# Patient Record
Sex: FEMALE | Race: WHITE | NOT HISPANIC OR LATINO | URBAN - METROPOLITAN AREA
[De-identification: names, ages, dates, MRNs, and addresses within clinical notes are randomized per-mention and may not be internally consistent; named-entity substitution may affect disease eponyms.]

---

## 2017-06-01 PROBLEM — Z00.00 ENCOUNTER FOR PREVENTIVE HEALTH EXAMINATION: Status: ACTIVE | Noted: 2017-06-01

## 2017-06-02 ENCOUNTER — OUTPATIENT (OUTPATIENT)
Dept: OUTPATIENT SERVICES | Facility: HOSPITAL | Age: 42
LOS: 1 days | End: 2017-06-02
Payer: COMMERCIAL

## 2017-06-02 VITALS
RESPIRATION RATE: 16 BRPM | SYSTOLIC BLOOD PRESSURE: 140 MMHG | HEART RATE: 92 BPM | HEIGHT: 65 IN | TEMPERATURE: 98 F | DIASTOLIC BLOOD PRESSURE: 80 MMHG | WEIGHT: 293 LBS

## 2017-06-02 DIAGNOSIS — Z98.890 OTHER SPECIFIED POSTPROCEDURAL STATES: Chronic | ICD-10-CM

## 2017-06-02 DIAGNOSIS — Z01.818 ENCOUNTER FOR OTHER PREPROCEDURAL EXAMINATION: ICD-10-CM

## 2017-06-02 DIAGNOSIS — D27.0 BENIGN NEOPLASM OF RIGHT OVARY: Chronic | ICD-10-CM

## 2017-06-02 DIAGNOSIS — N93.8 OTHER SPECIFIED ABNORMAL UTERINE AND VAGINAL BLEEDING: ICD-10-CM

## 2017-06-02 DIAGNOSIS — I10 ESSENTIAL (PRIMARY) HYPERTENSION: ICD-10-CM

## 2017-06-02 DIAGNOSIS — Z90.721 ACQUIRED ABSENCE OF OVARIES, UNILATERAL: Chronic | ICD-10-CM

## 2017-06-02 LAB
ANION GAP SERPL CALC-SCNC: 14 MMOL/L — SIGNIFICANT CHANGE UP (ref 5–17)
APTT BLD: 28.4 SEC — SIGNIFICANT CHANGE UP (ref 27.5–37.4)
BASOPHILS # BLD AUTO: 0 K/UL — SIGNIFICANT CHANGE UP (ref 0–0.2)
BASOPHILS NFR BLD AUTO: 0.3 % — SIGNIFICANT CHANGE UP (ref 0–2)
BUN SERPL-MCNC: 13 MG/DL — SIGNIFICANT CHANGE UP (ref 8–20)
CALCIUM SERPL-MCNC: 9.3 MG/DL — SIGNIFICANT CHANGE UP (ref 8.6–10.2)
CHLORIDE SERPL-SCNC: 98 MMOL/L — SIGNIFICANT CHANGE UP (ref 98–107)
CO2 SERPL-SCNC: 25 MMOL/L — SIGNIFICANT CHANGE UP (ref 22–29)
CREAT SERPL-MCNC: 0.4 MG/DL — LOW (ref 0.5–1.3)
EOSINOPHIL # BLD AUTO: 0.2 K/UL — SIGNIFICANT CHANGE UP (ref 0–0.5)
EOSINOPHIL NFR BLD AUTO: 2.1 % — SIGNIFICANT CHANGE UP (ref 0–6)
GLUCOSE SERPL-MCNC: 105 MG/DL — SIGNIFICANT CHANGE UP (ref 70–115)
HCT VFR BLD CALC: 36.9 % — LOW (ref 37–47)
HGB BLD-MCNC: 11.8 G/DL — LOW (ref 12–16)
INR BLD: 0.95 RATIO — SIGNIFICANT CHANGE UP (ref 0.88–1.16)
LYMPHOCYTES # BLD AUTO: 2.4 K/UL — SIGNIFICANT CHANGE UP (ref 1–4.8)
LYMPHOCYTES # BLD AUTO: 20.6 % — SIGNIFICANT CHANGE UP (ref 20–55)
MCHC RBC-ENTMCNC: 27.8 PG — SIGNIFICANT CHANGE UP (ref 27–31)
MCHC RBC-ENTMCNC: 32 G/DL — SIGNIFICANT CHANGE UP (ref 32–36)
MCV RBC AUTO: 86.8 FL — SIGNIFICANT CHANGE UP (ref 81–99)
MONOCYTES # BLD AUTO: 0.7 K/UL — SIGNIFICANT CHANGE UP (ref 0–0.8)
MONOCYTES NFR BLD AUTO: 5.7 % — SIGNIFICANT CHANGE UP (ref 3–10)
NEUTROPHILS # BLD AUTO: 8.2 K/UL — HIGH (ref 1.8–8)
NEUTROPHILS NFR BLD AUTO: 70.5 % — SIGNIFICANT CHANGE UP (ref 37–73)
PLATELET # BLD AUTO: 395 K/UL — SIGNIFICANT CHANGE UP (ref 150–400)
POTASSIUM SERPL-MCNC: 4.4 MMOL/L — SIGNIFICANT CHANGE UP (ref 3.5–5.3)
POTASSIUM SERPL-SCNC: 4.4 MMOL/L — SIGNIFICANT CHANGE UP (ref 3.5–5.3)
PROTHROM AB SERPL-ACNC: 10.4 SEC — SIGNIFICANT CHANGE UP (ref 9.8–12.7)
RBC # BLD: 4.25 M/UL — LOW (ref 4.4–5.2)
RBC # FLD: 14.4 % — SIGNIFICANT CHANGE UP (ref 11–15.6)
SODIUM SERPL-SCNC: 137 MMOL/L — SIGNIFICANT CHANGE UP (ref 135–145)
WBC # BLD: 11.7 K/UL — HIGH (ref 4.8–10.8)
WBC # FLD AUTO: 11.7 K/UL — HIGH (ref 4.8–10.8)

## 2017-06-02 PROCEDURE — 85027 COMPLETE CBC AUTOMATED: CPT

## 2017-06-02 PROCEDURE — 93005 ELECTROCARDIOGRAM TRACING: CPT

## 2017-06-02 PROCEDURE — 85610 PROTHROMBIN TIME: CPT

## 2017-06-02 PROCEDURE — 85730 THROMBOPLASTIN TIME PARTIAL: CPT

## 2017-06-02 PROCEDURE — G0463: CPT

## 2017-06-02 PROCEDURE — 93010 ELECTROCARDIOGRAM REPORT: CPT

## 2017-06-02 PROCEDURE — 80048 BASIC METABOLIC PNL TOTAL CA: CPT

## 2017-06-02 NOTE — H&P PST ADULT - NSANTHOSAYNRD_GEN_A_CORE
No. ALAN screening performed.  STOP BANG Legend: 0-2 = LOW Risk; 3-4 = INTERMEDIATE Risk; 5-8 = HIGH Risk

## 2017-06-02 NOTE — H&P PST ADULT - ASSESSMENT
41 year old female  present to [facility] for dilation and curettage, hysteroscopy

## 2017-06-02 NOTE — ASU PATIENT PROFILE, ADULT - LEARNING ASSESSMENT (PATIENT) ADDITIONAL COMMENTS
Instructed pt on pre-op instructions, tips for safer surgery, pain management scale and verbalized understanding of all. Ebola Screening: Negative,

## 2017-06-02 NOTE — H&P PST ADULT - HISTORY OF PRESENT ILLNESS
41 year old female  with PMHX HTN, who has been experiencing irregular and heavy mensuration x 3 months. She is now schedule for dilation and curettage, hysteroscopy.

## 2017-06-02 NOTE — ASU PATIENT PROFILE, ADULT - PSH
Benign teratoma of ovary, right    H/O oophorectomy  right  History of arthroscopy of right shoulder

## 2017-06-09 ENCOUNTER — OUTPATIENT (OUTPATIENT)
Dept: OUTPATIENT SERVICES | Facility: HOSPITAL | Age: 42
LOS: 1 days | Discharge: HOME | End: 2017-06-09
Payer: COMMERCIAL

## 2017-06-09 ENCOUNTER — RESULT REVIEW (OUTPATIENT)
Age: 42
End: 2017-06-09

## 2017-06-09 VITALS
SYSTOLIC BLOOD PRESSURE: 150 MMHG | OXYGEN SATURATION: 100 % | TEMPERATURE: 98 F | DIASTOLIC BLOOD PRESSURE: 95 MMHG | RESPIRATION RATE: 16 BRPM | HEIGHT: 65 IN | HEART RATE: 100 BPM | WEIGHT: 293 LBS

## 2017-06-09 VITALS
HEART RATE: 95 BPM | OXYGEN SATURATION: 100 % | SYSTOLIC BLOOD PRESSURE: 143 MMHG | TEMPERATURE: 97 F | RESPIRATION RATE: 16 BRPM | DIASTOLIC BLOOD PRESSURE: 79 MMHG

## 2017-06-09 DIAGNOSIS — Z90.721 ACQUIRED ABSENCE OF OVARIES, UNILATERAL: Chronic | ICD-10-CM

## 2017-06-09 DIAGNOSIS — Z98.890 OTHER SPECIFIED POSTPROCEDURAL STATES: Chronic | ICD-10-CM

## 2017-06-09 DIAGNOSIS — D27.0 BENIGN NEOPLASM OF RIGHT OVARY: Chronic | ICD-10-CM

## 2017-06-09 DIAGNOSIS — G54.2 CERVICAL ROOT DISORDERS, NOT ELSEWHERE CLASSIFIED: ICD-10-CM

## 2017-06-09 DIAGNOSIS — N93.8 OTHER SPECIFIED ABNORMAL UTERINE AND VAGINAL BLEEDING: ICD-10-CM

## 2017-06-09 PROCEDURE — 88305 TISSUE EXAM BY PATHOLOGIST: CPT

## 2017-06-09 PROCEDURE — 88305 TISSUE EXAM BY PATHOLOGIST: CPT | Mod: 26

## 2017-06-09 PROCEDURE — 58558 HYSTEROSCOPY BIOPSY: CPT

## 2017-06-09 RX ORDER — SODIUM CHLORIDE 9 MG/ML
1000 INJECTION, SOLUTION INTRAVENOUS
Qty: 0 | Refills: 0 | Status: DISCONTINUED | OUTPATIENT
Start: 2017-06-09 | End: 2017-06-24

## 2017-06-09 RX ORDER — FENTANYL CITRATE 50 UG/ML
25 INJECTION INTRAVENOUS
Qty: 0 | Refills: 0 | Status: DISCONTINUED | OUTPATIENT
Start: 2017-06-09 | End: 2017-06-09

## 2017-06-09 RX ORDER — KETOROLAC TROMETHAMINE 30 MG/ML
10 SYRINGE (ML) INJECTION ONCE
Qty: 0 | Refills: 0 | Status: DISCONTINUED | OUTPATIENT
Start: 2017-06-09 | End: 2017-06-24

## 2017-06-09 RX ORDER — IBUPROFEN 200 MG
0 TABLET ORAL
Qty: 0 | Refills: 0 | COMMUNITY

## 2017-06-09 RX ORDER — LANOLIN ALCOHOL/MO/W.PET/CERES
1 CREAM (GRAM) TOPICAL
Qty: 0 | Refills: 0 | COMMUNITY

## 2017-06-09 RX ORDER — FEXOFENADINE HCL 30 MG
1 TABLET ORAL
Qty: 0 | Refills: 0 | COMMUNITY

## 2017-06-09 RX ORDER — ONDANSETRON 8 MG/1
4 TABLET, FILM COATED ORAL ONCE
Qty: 0 | Refills: 0 | Status: COMPLETED | OUTPATIENT
Start: 2017-06-09 | End: 2017-06-09

## 2017-06-09 RX ORDER — SODIUM CHLORIDE 9 MG/ML
1000 INJECTION, SOLUTION INTRAVENOUS
Qty: 0 | Refills: 0 | Status: DISCONTINUED | OUTPATIENT
Start: 2017-06-09 | End: 2017-06-09

## 2017-06-09 RX ORDER — SODIUM CHLORIDE 9 MG/ML
3 INJECTION INTRAMUSCULAR; INTRAVENOUS; SUBCUTANEOUS ONCE
Qty: 0 | Refills: 0 | Status: DISCONTINUED | OUTPATIENT
Start: 2017-06-09 | End: 2017-06-09

## 2017-06-09 RX ADMIN — FENTANYL CITRATE 25 MICROGRAM(S): 50 INJECTION INTRAVENOUS at 17:15

## 2017-06-09 RX ADMIN — ONDANSETRON 4 MILLIGRAM(S): 8 TABLET, FILM COATED ORAL at 16:58

## 2017-06-09 RX ADMIN — FENTANYL CITRATE 25 MICROGRAM(S): 50 INJECTION INTRAVENOUS at 16:56

## 2017-06-10 ENCOUNTER — TRANSCRIPTION ENCOUNTER (OUTPATIENT)
Age: 42
End: 2017-06-10

## 2017-06-13 LAB — SURGICAL PATHOLOGY FINAL REPORT - CH: SIGNIFICANT CHANGE UP

## 2017-11-27 ENCOUNTER — OUTPATIENT (OUTPATIENT)
Dept: OUTPATIENT SERVICES | Facility: HOSPITAL | Age: 42
LOS: 1 days | End: 2017-11-27
Payer: COMMERCIAL

## 2017-11-27 ENCOUNTER — RESULT REVIEW (OUTPATIENT)
Age: 42
End: 2017-11-27

## 2017-11-27 VITALS
WEIGHT: 293 LBS | OXYGEN SATURATION: 98 % | DIASTOLIC BLOOD PRESSURE: 71 MMHG | SYSTOLIC BLOOD PRESSURE: 137 MMHG | HEART RATE: 129 BPM | HEIGHT: 65 IN | TEMPERATURE: 102 F | RESPIRATION RATE: 20 BRPM

## 2017-11-27 VITALS
WEIGHT: 293 LBS | SYSTOLIC BLOOD PRESSURE: 140 MMHG | RESPIRATION RATE: 24 BRPM | HEIGHT: 65 IN | TEMPERATURE: 103 F | HEART RATE: 120 BPM | DIASTOLIC BLOOD PRESSURE: 90 MMHG

## 2017-11-27 VITALS
OXYGEN SATURATION: 100 % | HEART RATE: 101 BPM | RESPIRATION RATE: 15 BRPM | DIASTOLIC BLOOD PRESSURE: 78 MMHG | SYSTOLIC BLOOD PRESSURE: 128 MMHG

## 2017-11-27 DIAGNOSIS — Z01.818 ENCOUNTER FOR OTHER PREPROCEDURAL EXAMINATION: ICD-10-CM

## 2017-11-27 DIAGNOSIS — Z98.890 OTHER SPECIFIED POSTPROCEDURAL STATES: Chronic | ICD-10-CM

## 2017-11-27 DIAGNOSIS — D27.0 BENIGN NEOPLASM OF RIGHT OVARY: Chronic | ICD-10-CM

## 2017-11-27 DIAGNOSIS — N92.0 EXCESSIVE AND FREQUENT MENSTRUATION WITH REGULAR CYCLE: ICD-10-CM

## 2017-11-27 DIAGNOSIS — Z90.721 ACQUIRED ABSENCE OF OVARIES, UNILATERAL: Chronic | ICD-10-CM

## 2017-11-27 LAB
ANION GAP SERPL CALC-SCNC: 13 MMOL/L — SIGNIFICANT CHANGE UP (ref 5–17)
BASOPHILS # BLD AUTO: 0 K/UL — SIGNIFICANT CHANGE UP (ref 0–0.2)
BASOPHILS NFR BLD AUTO: 0.2 % — SIGNIFICANT CHANGE UP (ref 0–2)
BLD GP AB SCN SERPL QL: SIGNIFICANT CHANGE UP
BUN SERPL-MCNC: 8 MG/DL — SIGNIFICANT CHANGE UP (ref 8–20)
CALCIUM SERPL-MCNC: 9.3 MG/DL — SIGNIFICANT CHANGE UP (ref 8.6–10.2)
CHLORIDE SERPL-SCNC: 94 MMOL/L — LOW (ref 98–107)
CO2 SERPL-SCNC: 27 MMOL/L — SIGNIFICANT CHANGE UP (ref 22–29)
CREAT SERPL-MCNC: 0.54 MG/DL — SIGNIFICANT CHANGE UP (ref 0.5–1.3)
EOSINOPHIL # BLD AUTO: 0 K/UL — SIGNIFICANT CHANGE UP (ref 0–0.5)
EOSINOPHIL NFR BLD AUTO: 0.2 % — SIGNIFICANT CHANGE UP (ref 0–6)
GLUCOSE SERPL-MCNC: 151 MG/DL — HIGH (ref 70–115)
HCG UR QL: NEGATIVE — SIGNIFICANT CHANGE UP
HCT VFR BLD CALC: 31.5 % — LOW (ref 37–47)
HGB BLD-MCNC: 10.2 G/DL — LOW (ref 12–16)
LYMPHOCYTES # BLD AUTO: 10.6 % — LOW (ref 20–55)
LYMPHOCYTES # BLD AUTO: 2.2 K/UL — SIGNIFICANT CHANGE UP (ref 1–4.8)
MCHC RBC-ENTMCNC: 26.4 PG — LOW (ref 27–31)
MCHC RBC-ENTMCNC: 32.4 G/DL — SIGNIFICANT CHANGE UP (ref 32–36)
MCV RBC AUTO: 81.6 FL — SIGNIFICANT CHANGE UP (ref 81–99)
MONOCYTES # BLD AUTO: 1.4 K/UL — HIGH (ref 0–0.8)
MONOCYTES NFR BLD AUTO: 6.8 % — SIGNIFICANT CHANGE UP (ref 3–10)
NEUTROPHILS # BLD AUTO: 16.8 K/UL — HIGH (ref 1.8–8)
NEUTROPHILS NFR BLD AUTO: 80.2 % — HIGH (ref 37–73)
PLATELET # BLD AUTO: 405 K/UL — HIGH (ref 150–400)
POTASSIUM SERPL-MCNC: 4.5 MMOL/L — SIGNIFICANT CHANGE UP (ref 3.5–5.3)
POTASSIUM SERPL-SCNC: 4.5 MMOL/L — SIGNIFICANT CHANGE UP (ref 3.5–5.3)
RBC # BLD: 3.86 M/UL — LOW (ref 4.4–5.2)
RBC # FLD: 15.7 % — HIGH (ref 11–15.6)
SODIUM SERPL-SCNC: 134 MMOL/L — LOW (ref 135–145)
TYPE + AB SCN PNL BLD: SIGNIFICANT CHANGE UP
WBC # BLD: 21 K/UL — HIGH (ref 4.8–10.8)
WBC # FLD AUTO: 21 K/UL — HIGH (ref 4.8–10.8)

## 2017-11-27 PROCEDURE — 36415 COLL VENOUS BLD VENIPUNCTURE: CPT

## 2017-11-27 PROCEDURE — 93005 ELECTROCARDIOGRAM TRACING: CPT

## 2017-11-27 PROCEDURE — 86850 RBC ANTIBODY SCREEN: CPT

## 2017-11-27 PROCEDURE — 58120 DILATION AND CURETTAGE: CPT

## 2017-11-27 PROCEDURE — 86900 BLOOD TYPING SEROLOGIC ABO: CPT

## 2017-11-27 PROCEDURE — 86901 BLOOD TYPING SEROLOGIC RH(D): CPT

## 2017-11-27 PROCEDURE — 88305 TISSUE EXAM BY PATHOLOGIST: CPT

## 2017-11-27 PROCEDURE — 80048 BASIC METABOLIC PNL TOTAL CA: CPT

## 2017-11-27 PROCEDURE — 81025 URINE PREGNANCY TEST: CPT

## 2017-11-27 PROCEDURE — G0463: CPT

## 2017-11-27 PROCEDURE — 93010 ELECTROCARDIOGRAM REPORT: CPT

## 2017-11-27 PROCEDURE — 85027 COMPLETE CBC AUTOMATED: CPT

## 2017-11-27 PROCEDURE — 88305 TISSUE EXAM BY PATHOLOGIST: CPT | Mod: 26

## 2017-11-27 RX ORDER — MEDROXYPROGESTERONE ACETATE 150 MG/ML
1 INJECTION, SUSPENSION, EXTENDED RELEASE INTRAMUSCULAR
Qty: 0 | Refills: 0 | COMMUNITY

## 2017-11-27 RX ORDER — IBUPROFEN 200 MG
1 TABLET ORAL
Qty: 0 | Refills: 0 | COMMUNITY

## 2017-11-27 RX ORDER — ONDANSETRON 8 MG/1
4 TABLET, FILM COATED ORAL ONCE
Qty: 0 | Refills: 0 | Status: DISCONTINUED | OUTPATIENT
Start: 2017-11-27 | End: 2017-11-27

## 2017-11-27 RX ORDER — FENTANYL CITRATE 50 UG/ML
50 INJECTION INTRAVENOUS
Qty: 0 | Refills: 0 | Status: DISCONTINUED | OUTPATIENT
Start: 2017-11-27 | End: 2017-11-27

## 2017-11-27 RX ORDER — SODIUM CHLORIDE 9 MG/ML
1000 INJECTION, SOLUTION INTRAVENOUS
Qty: 0 | Refills: 0 | Status: DISCONTINUED | OUTPATIENT
Start: 2017-11-27 | End: 2017-11-27

## 2017-11-27 RX ORDER — SODIUM CHLORIDE 9 MG/ML
3 INJECTION INTRAMUSCULAR; INTRAVENOUS; SUBCUTANEOUS EVERY 8 HOURS
Qty: 0 | Refills: 0 | Status: DISCONTINUED | OUTPATIENT
Start: 2017-11-27 | End: 2017-11-27

## 2017-11-27 RX ADMIN — FENTANYL CITRATE 50 MICROGRAM(S): 50 INJECTION INTRAVENOUS at 18:12

## 2017-11-27 RX ADMIN — FENTANYL CITRATE 50 MICROGRAM(S): 50 INJECTION INTRAVENOUS at 17:50

## 2017-11-27 RX ADMIN — FENTANYL CITRATE 50 MICROGRAM(S): 50 INJECTION INTRAVENOUS at 18:00

## 2017-11-27 NOTE — H&P PST ADULT - PSH
Benign teratoma of ovary, right    H/O oophorectomy  right  History of arthroscopy of right shoulder    S/P dilation and curettage

## 2017-11-27 NOTE — H&P PST ADULT - ASSESSMENT
43 yo female with menometrorrhagia.   Febrile, tachycardic, c/o nasal congestion; Dr. Ruff made aware Dr. Arteaga made aware.

## 2017-11-27 NOTE — PROGRESS NOTE ADULT - SUBJECTIVE AND OBJECTIVE BOX
Patient had heavy vaginal bleeding in the office today. Needs emergency D&C despite being febrile. Pre-op antibiotics planned.

## 2017-11-27 NOTE — ASU PATIENT PROFILE, ADULT - LEARNING ASSESSMENT (PATIENT) ADDITIONAL COMMENTS
Instructed pt on pre-op instructions, tips for safer surgery, pain management scale, last drank 1000 today and last ate prior to 2300 11/26/17. Verbalized understanding of all. Oral temp. 102.1 Nurse practitioner Emily Garcia notified of Vital Signs,

## 2017-11-27 NOTE — H&P PST ADULT - FAMILY HISTORY
Father  Still living? No  Family history of bladder cancer, Age at diagnosis: 71-80     Mother  Still living? No  Family history of breast cancer, Age at diagnosis: 41-50

## 2017-11-27 NOTE — ASU DISCHARGE PLAN (ADULT/PEDIATRIC). - NOTIFY
Fever greater than 101/Inability to Tolerate Liquids or Foods/Bleeding that does not stop/GYN Fever>100.4

## 2017-11-29 LAB — SURGICAL PATHOLOGY FINAL REPORT - CH: SIGNIFICANT CHANGE UP

## 2018-01-03 ENCOUNTER — TRANSCRIPTION ENCOUNTER (OUTPATIENT)
Age: 43
End: 2018-01-03

## 2018-01-26 ENCOUNTER — RECORD ABSTRACTING (OUTPATIENT)
Age: 43
End: 2018-01-26

## 2018-01-26 DIAGNOSIS — Z78.9 OTHER SPECIFIED HEALTH STATUS: ICD-10-CM

## 2018-01-26 DIAGNOSIS — Z87.2 PERSONAL HISTORY OF DISEASES OF THE SKIN AND SUBCUTANEOUS TISSUE: ICD-10-CM

## 2018-01-26 DIAGNOSIS — Z82.49 FAMILY HISTORY OF ISCHEMIC HEART DISEASE AND OTHER DISEASES OF THE CIRCULATORY SYSTEM: ICD-10-CM

## 2018-01-26 DIAGNOSIS — Z87.891 PERSONAL HISTORY OF NICOTINE DEPENDENCE: ICD-10-CM

## 2018-02-23 ENCOUNTER — APPOINTMENT (OUTPATIENT)
Dept: CARDIOLOGY | Facility: CLINIC | Age: 43
End: 2018-02-23
Payer: COMMERCIAL

## 2018-02-23 ENCOUNTER — MEDICATION RENEWAL (OUTPATIENT)
Age: 43
End: 2018-02-23

## 2018-02-23 VITALS
WEIGHT: 293 LBS | DIASTOLIC BLOOD PRESSURE: 102 MMHG | BODY MASS INDEX: 48.82 KG/M2 | RESPIRATION RATE: 14 BRPM | HEIGHT: 65 IN | HEART RATE: 108 BPM | SYSTOLIC BLOOD PRESSURE: 160 MMHG

## 2018-02-23 PROCEDURE — 99214 OFFICE O/P EST MOD 30 MIN: CPT

## 2018-02-23 PROCEDURE — 93000 ELECTROCARDIOGRAM COMPLETE: CPT

## 2018-03-01 ENCOUNTER — APPOINTMENT (OUTPATIENT)
Dept: CARDIOLOGY | Facility: CLINIC | Age: 43
End: 2018-03-01
Payer: COMMERCIAL

## 2018-03-01 PROCEDURE — 93306 TTE W/DOPPLER COMPLETE: CPT

## 2018-03-27 DIAGNOSIS — Z87.19 PERSONAL HISTORY OF OTHER DISEASES OF THE DIGESTIVE SYSTEM: ICD-10-CM

## 2018-05-07 ENCOUNTER — APPOINTMENT (OUTPATIENT)
Dept: CARDIOLOGY | Facility: CLINIC | Age: 43
End: 2018-05-07

## 2018-07-23 PROBLEM — I10 ESSENTIAL (PRIMARY) HYPERTENSION: Chronic | Status: ACTIVE | Noted: 2017-06-02

## 2018-07-24 ENCOUNTER — APPOINTMENT (OUTPATIENT)
Dept: BARIATRICS | Facility: CLINIC | Age: 43
End: 2018-07-24
Payer: COMMERCIAL

## 2018-07-24 VITALS
HEIGHT: 65 IN | OXYGEN SATURATION: 96 % | SYSTOLIC BLOOD PRESSURE: 150 MMHG | DIASTOLIC BLOOD PRESSURE: 94 MMHG | WEIGHT: 293 LBS | HEART RATE: 103 BPM | BODY MASS INDEX: 48.82 KG/M2

## 2018-07-24 DIAGNOSIS — Z01.818 ENCOUNTER FOR OTHER PREPROCEDURAL EXAMINATION: ICD-10-CM

## 2018-07-24 DIAGNOSIS — Z80.3 FAMILY HISTORY OF MALIGNANT NEOPLASM OF BREAST: ICD-10-CM

## 2018-07-24 DIAGNOSIS — N92.6 IRREGULAR MENSTRUATION, UNSPECIFIED: ICD-10-CM

## 2018-07-24 DIAGNOSIS — Z83.3 FAMILY HISTORY OF DIABETES MELLITUS: ICD-10-CM

## 2018-07-24 DIAGNOSIS — Z80.51 FAMILY HISTORY OF MALIGNANT NEOPLASM OF KIDNEY: ICD-10-CM

## 2018-07-24 DIAGNOSIS — Z83.49 FAMILY HISTORY OF OTHER ENDOCRINE, NUTRITIONAL AND METABOLIC DISEASES: ICD-10-CM

## 2018-07-24 DIAGNOSIS — Z82.49 FAMILY HISTORY OF ISCHEMIC HEART DISEASE AND OTHER DISEASES OF THE CIRCULATORY SYSTEM: ICD-10-CM

## 2018-07-24 PROCEDURE — 99244 OFF/OP CNSLTJ NEW/EST MOD 40: CPT

## 2018-07-24 RX ORDER — IBUPROFEN 800 MG/1
800 TABLET, FILM COATED ORAL
Qty: 30 | Refills: 0 | Status: COMPLETED | COMMUNITY
Start: 2017-11-24 | End: 2018-07-24

## 2018-07-24 RX ORDER — VALSARTAN AND HYDROCHLOROTHIAZIDE 320; 25 MG/1; MG/1
320-25 TABLET, FILM COATED ORAL DAILY
Qty: 90 | Refills: 3 | Status: DISCONTINUED | COMMUNITY
End: 2018-07-24

## 2018-07-24 RX ORDER — NORETHINDRONE 0.35 MG
0.35 KIT ORAL
Qty: 28 | Refills: 0 | Status: COMPLETED | COMMUNITY
Start: 2017-11-21 | End: 2018-07-24

## 2018-07-24 RX ORDER — OMEPRAZOLE 40 MG/1
40 CAPSULE, DELAYED RELEASE ORAL
Qty: 30 | Refills: 0 | Status: COMPLETED | COMMUNITY
Start: 2017-12-27 | End: 2018-07-24

## 2018-09-11 ENCOUNTER — APPOINTMENT (OUTPATIENT)
Dept: BARIATRICS/WEIGHT MGMT | Facility: CLINIC | Age: 43
End: 2018-09-11
Payer: COMMERCIAL

## 2018-09-11 VITALS — WEIGHT: 293 LBS | BODY MASS INDEX: 63.39 KG/M2

## 2018-09-11 DIAGNOSIS — Z78.9 OTHER SPECIFIED HEALTH STATUS: ICD-10-CM

## 2018-09-11 PROCEDURE — 90791 PSYCH DIAGNOSTIC EVALUATION: CPT

## 2018-09-21 PROBLEM — Z87.19 HISTORY OF DIVERTICULOSIS: Status: RESOLVED | Noted: 2018-09-21 | Resolved: 2018-09-21

## 2018-10-04 ENCOUNTER — APPOINTMENT (OUTPATIENT)
Dept: BARIATRICS/WEIGHT MGMT | Facility: CLINIC | Age: 43
End: 2018-10-04
Payer: COMMERCIAL

## 2018-10-04 VITALS — HEIGHT: 65 IN | WEIGHT: 293 LBS | BODY MASS INDEX: 48.82 KG/M2

## 2018-10-04 PROCEDURE — 97802 MEDICAL NUTRITION INDIV IN: CPT

## 2018-11-16 ENCOUNTER — NON-APPOINTMENT (OUTPATIENT)
Age: 43
End: 2018-11-16

## 2018-11-16 ENCOUNTER — APPOINTMENT (OUTPATIENT)
Dept: CARDIOLOGY | Facility: CLINIC | Age: 43
End: 2018-11-16
Payer: COMMERCIAL

## 2018-11-16 VITALS
BODY MASS INDEX: 48.82 KG/M2 | DIASTOLIC BLOOD PRESSURE: 90 MMHG | HEIGHT: 65 IN | HEART RATE: 94 BPM | SYSTOLIC BLOOD PRESSURE: 154 MMHG | WEIGHT: 293 LBS | OXYGEN SATURATION: 99 %

## 2018-11-16 DIAGNOSIS — Z01.810 ENCOUNTER FOR PREPROCEDURAL CARDIOVASCULAR EXAMINATION: ICD-10-CM

## 2018-11-16 PROCEDURE — 93000 ELECTROCARDIOGRAM COMPLETE: CPT

## 2018-11-16 PROCEDURE — 99244 OFF/OP CNSLTJ NEW/EST MOD 40: CPT | Mod: 25

## 2018-11-16 RX ORDER — OLMESARTAN MEDOXOMIL AND HYDROCHLOROTHIAZIDE 40; 25 MG/1; MG/1
40-25 TABLET ORAL
Qty: 90 | Refills: 1 | Status: DISCONTINUED | COMMUNITY
Start: 2018-07-24 | End: 2018-11-16

## 2018-11-16 NOTE — ASSESSMENT
[FreeTextEntry1] : 43 year old female with morbid obesity without active symptoms \par \par \par ASHLEY on unusual exertion possibly due to obesity , normal EF .   recommended weight loss , will obtain exercise stress test to asses exercise capacity prior to surgery \par \par \par HTN with mild LVH on echo , mild uncontrolled  non compliance to diet  , patient wants to switch medication she was on valsartan/hctz 160/12.5 mg po daily , recheck BP ,after diet restriction , \par \par ALAN ,  continue to use cpap \par \par \par Patient is appears optimal stable provided her stress ekg is normal ,

## 2018-11-16 NOTE — HISTORY OF PRESENT ILLNESS
[FreeTextEntry1] : 43 year old female with hx DM, HTN, hypothyroidism , Morbid obesity ,sleep apnea who came for cardiac evaluation prior to bariatric surgery , Patient denies any active symptoms , does get shortness of breath un usual activity , patient is physical active \par \par Patient  does  have occasional GERD , improved with omeprazole    patient is using Cpap machine regularly\par \par

## 2018-11-16 NOTE — REASON FOR VISIT
[Consultation] : a consultation regarding [Hypertension] : hypertension [Medication Management] : Medication management [FreeTextEntry1] : sleep apnea , morbid obesity

## 2018-11-16 NOTE — PHYSICAL EXAM
[General Appearance - Well Developed] : well developed [Normal Conjunctiva] : the conjunctiva exhibited no abnormalities [Normal Oral Mucosa] : normal oral mucosa [Normal Jugular Venous A Waves Present] : normal jugular venous A waves present [Heart Rate And Rhythm] : heart rate and rhythm were normal [Heart Sounds] : normal S1 and S2 [Murmurs] : no murmurs present [Arterial Pulses Normal] : the arterial pulses were normal [Edema] : no peripheral edema present [Veins - Varicosity Changes] : no varicosital changes were noted in the lower extremities [] : no respiratory distress [Respiration, Rhythm And Depth] : normal respiratory rhythm and effort [Exaggerated Use Of Accessory Muscles For Inspiration] : no accessory muscle use [Auscultation Breath Sounds / Voice Sounds] : lungs were clear to auscultation bilaterally [Chest Palpation] : palpation of the chest revealed no abnormalities [Lungs Percussion] : the lungs were normal to percussion [Bowel Sounds] : normal bowel sounds [Abdomen Soft] : soft [FreeTextEntry1] : obesity [Abnormal Walk] : normal gait [Nail Clubbing] : no clubbing of the fingernails [Skin Turgor] : normal skin turgor [Oriented To Time, Place, And Person] : oriented to person, place, and time

## 2018-11-16 NOTE — REVIEW OF SYSTEMS
[Fever] : no fever [Blurry Vision] : no blurred vision [Mouth Sores] : no mouth sores [Shortness Of Breath] : no shortness of breath [Dyspnea on exertion] : dyspnea during exertion [Lower Ext Edema] : no extremity edema [Palpitations] : no palpitations [Abdominal Pain] : no abdominal pain [Heartburn] : no heartburn [Dysuria] : no dysuria [Joint Pain] : no joint pain [Skin: A Rash] : no rash: [Dizziness] : no dizziness [Convulsions] : no convulsions [Anxiety] : no anxiety [Excessive Thirst] : no polydipsia [Easy Bleeding] : no tendency for easy bleeding

## 2018-11-20 ENCOUNTER — APPOINTMENT (OUTPATIENT)
Dept: CARDIOLOGY | Facility: CLINIC | Age: 43
End: 2018-11-20

## 2018-11-26 ENCOUNTER — APPOINTMENT (OUTPATIENT)
Dept: CARDIOLOGY | Facility: CLINIC | Age: 43
End: 2018-11-26
Payer: COMMERCIAL

## 2018-11-26 PROCEDURE — 93015 CV STRESS TEST SUPVJ I&R: CPT

## 2018-12-12 ENCOUNTER — RESULT REVIEW (OUTPATIENT)
Age: 43
End: 2018-12-12

## 2018-12-13 ENCOUNTER — APPOINTMENT (OUTPATIENT)
Dept: BARIATRICS | Facility: CLINIC | Age: 43
End: 2018-12-13
Payer: COMMERCIAL

## 2018-12-13 PROCEDURE — 99214 OFFICE O/P EST MOD 30 MIN: CPT

## 2018-12-13 RX ORDER — FLUTICASONE PROPIONATE 50 UG/1
50 SPRAY, METERED NASAL
Qty: 16 | Refills: 0 | Status: DISCONTINUED | COMMUNITY
Start: 2018-07-09

## 2018-12-13 RX ORDER — EMPAGLIFLOZIN AND METFORMIN HYDROCHLORIDE 12.5; 5 MG/1; MG/1
12.5-5 TABLET ORAL TWICE DAILY
Refills: 0 | Status: DISCONTINUED | COMMUNITY
End: 2018-12-13

## 2018-12-13 RX ORDER — LEVOCETIRIZINE DIHYDROCHLORIDE 5 MG/1
5 TABLET ORAL
Qty: 30 | Refills: 0 | Status: DISCONTINUED | COMMUNITY
Start: 2018-07-09

## 2018-12-13 RX ORDER — MONTELUKAST 10 MG/1
10 TABLET, FILM COATED ORAL
Qty: 90 | Refills: 0 | Status: DISCONTINUED | COMMUNITY
Start: 2018-07-09

## 2018-12-14 ENCOUNTER — APPOINTMENT (OUTPATIENT)
Dept: BARIATRICS/WEIGHT MGMT | Facility: CLINIC | Age: 43
End: 2018-12-14
Payer: COMMERCIAL

## 2018-12-14 VITALS — HEIGHT: 65 IN | BODY MASS INDEX: 48.82 KG/M2 | WEIGHT: 293 LBS

## 2018-12-14 PROCEDURE — 97803 MED NUTRITION INDIV SUBSEQ: CPT

## 2019-01-03 ENCOUNTER — APPOINTMENT (OUTPATIENT)
Dept: BARIATRICS | Facility: CLINIC | Age: 44
End: 2019-01-03
Payer: COMMERCIAL

## 2019-01-03 VITALS
BODY MASS INDEX: 48.82 KG/M2 | HEART RATE: 105 BPM | HEIGHT: 65 IN | SYSTOLIC BLOOD PRESSURE: 140 MMHG | WEIGHT: 293 LBS | DIASTOLIC BLOOD PRESSURE: 90 MMHG | OXYGEN SATURATION: 96 %

## 2019-01-03 PROCEDURE — 99214 OFFICE O/P EST MOD 30 MIN: CPT

## 2019-01-03 RX ORDER — CLARITHROMYCIN 500 MG/1
500 TABLET, FILM COATED ORAL
Qty: 20 | Refills: 0 | Status: DISCONTINUED | COMMUNITY
Start: 2018-12-10 | End: 2019-01-03

## 2019-01-10 DIAGNOSIS — R00.2 PALPITATIONS: ICD-10-CM

## 2019-01-21 ENCOUNTER — APPOINTMENT (OUTPATIENT)
Dept: CARDIOLOGY | Facility: CLINIC | Age: 44
End: 2019-01-21
Payer: COMMERCIAL

## 2019-01-21 PROCEDURE — 93224 XTRNL ECG REC UP TO 48 HRS: CPT

## 2019-01-22 ENCOUNTER — APPOINTMENT (OUTPATIENT)
Dept: BARIATRICS | Facility: CLINIC | Age: 44
End: 2019-01-22
Payer: COMMERCIAL

## 2019-01-22 ENCOUNTER — OUTPATIENT (OUTPATIENT)
Dept: OUTPATIENT SERVICES | Facility: HOSPITAL | Age: 44
LOS: 1 days | End: 2019-01-22
Payer: COMMERCIAL

## 2019-01-22 ENCOUNTER — TRANSCRIPTION ENCOUNTER (OUTPATIENT)
Age: 44
End: 2019-01-22

## 2019-01-22 VITALS
HEART RATE: 106 BPM | SYSTOLIC BLOOD PRESSURE: 150 MMHG | BODY MASS INDEX: 48.82 KG/M2 | WEIGHT: 293 LBS | HEIGHT: 65 IN | DIASTOLIC BLOOD PRESSURE: 98 MMHG | OXYGEN SATURATION: 97 %

## 2019-01-22 VITALS
HEART RATE: 90 BPM | WEIGHT: 293 LBS | TEMPERATURE: 98 F | SYSTOLIC BLOOD PRESSURE: 140 MMHG | HEIGHT: 65 IN | RESPIRATION RATE: 14 BRPM | OXYGEN SATURATION: 99 % | DIASTOLIC BLOOD PRESSURE: 88 MMHG

## 2019-01-22 DIAGNOSIS — Z98.890 OTHER SPECIFIED POSTPROCEDURAL STATES: Chronic | ICD-10-CM

## 2019-01-22 DIAGNOSIS — G47.33 OBSTRUCTIVE SLEEP APNEA (ADULT) (PEDIATRIC): ICD-10-CM

## 2019-01-22 DIAGNOSIS — Z90.721 ACQUIRED ABSENCE OF OVARIES, UNILATERAL: Chronic | ICD-10-CM

## 2019-01-22 DIAGNOSIS — E66.01 MORBID (SEVERE) OBESITY DUE TO EXCESS CALORIES: ICD-10-CM

## 2019-01-22 DIAGNOSIS — Z01.818 ENCOUNTER FOR OTHER PREPROCEDURAL EXAMINATION: ICD-10-CM

## 2019-01-22 DIAGNOSIS — E11.9 TYPE 2 DIABETES MELLITUS WITHOUT COMPLICATIONS: ICD-10-CM

## 2019-01-22 DIAGNOSIS — D27.0 BENIGN NEOPLASM OF RIGHT OVARY: Chronic | ICD-10-CM

## 2019-01-22 LAB
ANION GAP SERPL CALC-SCNC: 10 MMOL/L — SIGNIFICANT CHANGE UP (ref 5–17)
BLD GP AB SCN SERPL QL: SIGNIFICANT CHANGE UP
BUN SERPL-MCNC: 16 MG/DL — SIGNIFICANT CHANGE UP (ref 7–23)
CALCIUM SERPL-MCNC: 9.1 MG/DL — SIGNIFICANT CHANGE UP (ref 8.4–10.5)
CHLORIDE SERPL-SCNC: 97 MMOL/L — SIGNIFICANT CHANGE UP (ref 96–108)
CO2 SERPL-SCNC: 29 MMOL/L — SIGNIFICANT CHANGE UP (ref 22–31)
CREAT SERPL-MCNC: 0.75 MG/DL — SIGNIFICANT CHANGE UP (ref 0.5–1.3)
GLUCOSE SERPL-MCNC: 142 MG/DL — HIGH (ref 70–99)
HCT VFR BLD CALC: 40.2 % — SIGNIFICANT CHANGE UP (ref 34.5–45)
HGB BLD-MCNC: 12.7 G/DL — SIGNIFICANT CHANGE UP (ref 11.5–15.5)
MCHC RBC-ENTMCNC: 24.8 PG — LOW (ref 27–34)
MCHC RBC-ENTMCNC: 31.6 GM/DL — LOW (ref 32–36)
MCV RBC AUTO: 78.5 FL — LOW (ref 80–100)
NRBC # BLD: 0 /100 WBCS — SIGNIFICANT CHANGE UP (ref 0–0)
PLATELET # BLD AUTO: 554 K/UL — HIGH (ref 150–400)
POTASSIUM SERPL-MCNC: 4.4 MMOL/L — SIGNIFICANT CHANGE UP (ref 3.5–5.3)
POTASSIUM SERPL-SCNC: 4.4 MMOL/L — SIGNIFICANT CHANGE UP (ref 3.5–5.3)
RBC # BLD: 5.12 M/UL — SIGNIFICANT CHANGE UP (ref 3.8–5.2)
RBC # FLD: 16.6 % — HIGH (ref 10.3–14.5)
SODIUM SERPL-SCNC: 136 MMOL/L — SIGNIFICANT CHANGE UP (ref 135–145)
WBC # BLD: 21.01 K/UL — HIGH (ref 3.8–10.5)
WBC # FLD AUTO: 21.01 K/UL — HIGH (ref 3.8–10.5)

## 2019-01-22 PROCEDURE — 86901 BLOOD TYPING SEROLOGIC RH(D): CPT

## 2019-01-22 PROCEDURE — 86850 RBC ANTIBODY SCREEN: CPT

## 2019-01-22 PROCEDURE — 99214 OFFICE O/P EST MOD 30 MIN: CPT

## 2019-01-22 PROCEDURE — 83036 HEMOGLOBIN GLYCOSYLATED A1C: CPT

## 2019-01-22 PROCEDURE — 80048 BASIC METABOLIC PNL TOTAL CA: CPT

## 2019-01-22 PROCEDURE — 36415 COLL VENOUS BLD VENIPUNCTURE: CPT

## 2019-01-22 PROCEDURE — G0463: CPT

## 2019-01-22 PROCEDURE — 86900 BLOOD TYPING SEROLOGIC ABO: CPT

## 2019-01-22 PROCEDURE — 85027 COMPLETE CBC AUTOMATED: CPT

## 2019-01-22 NOTE — H&P PST ADULT - PMH
Bronchitis  recent bronchitis last week  GERD (gastroesophageal reflux disease)    Hypertension    Hypothyroid    ALAN on CPAP    Type 2 diabetes mellitus Bronchitis  recent bronchitis last week  GERD (gastroesophageal reflux disease)    Hypertension    Hypothyroid    Morbid obesity with BMI of 60.0-69.9, adult    ALAN on CPAP    Type 2 diabetes mellitus

## 2019-01-22 NOTE — H&P PST ADULT - PSH
Benign teratoma of ovary, right    H/O oophorectomy  right  History of arthroscopy of right shoulder    S/P dilation and curettage  2017 x2

## 2019-01-22 NOTE — H&P PST ADULT - HISTORY OF PRESENT ILLNESS
This is a 44 y/o female who presents to [facility] for pre op evaluation for bariatric surgery .she has struggled to loose weight all her life and has tried all diets, nutritional counseling and exercise without any success to maintain weight loss.  scheduled for laparoscopic sleeve gastrectomy on 1/30/18

## 2019-01-23 PROBLEM — E66.9 OBESITY, UNSPECIFIED: Chronic | Status: INACTIVE | Noted: 2017-06-02 | Resolved: 2019-01-22

## 2019-01-23 LAB — HBA1C BLD-MCNC: 7.8 % — HIGH (ref 4–5.6)

## 2019-01-24 ENCOUNTER — NON-APPOINTMENT (OUTPATIENT)
Age: 44
End: 2019-01-24

## 2019-01-25 NOTE — ASSESSMENT
[FreeTextEntry1] : 43 year old female with longstanding history of morbid obesity and scheduled for laparoscopic sleeve gastrectomy next week presents today for preoperative visit. Since patient was placed on high dose steroids, she was informed that the surgery will need to be postponed. Nutrition and exercise guidelines were reviewed with the patient.  Patient was encouraged to lose weight prior to surgery. She will now have one protein shake and two lean and green meals a day until Feb 4 and then will resume preop modified liquid diet until surgery on 2/25.  Patient will attend preoperative education class.  Procedure and risks reviewed. All questions answered.\par \par Reschedule surgery for 2/25\par Three week preoperative modified liquid diet\par PST and Medical clearance\par Preoperative education class\par Follow up same day as PST\par Call with any questions or concerns.\par

## 2019-01-25 NOTE — HISTORY OF PRESENT ILLNESS
[de-identified] : 43 year old female with longstanding history of obesity undergoing workup for laparoscopic sleeve gastrectomy presents today for follow up visit. Patient was diagnosed with bronchitis over 10 days ago and was given cefdinir. Since she wasn’t feeling better, she went back to PCP on Thursday and was switched to Clarithromycin, given steroid shot and started oral prednisone 20 mg BID for 5 days and steroid inhaler. Patient is feeling better. Patient maintained weight since last visit. With regard to diet, she started preop modified diet and with increased hunger from steroids, she is also having vegetables between meals.  She walks 6K steps daily.  Patient had PST today.

## 2019-01-25 NOTE — REVIEW OF SYSTEMS
[Joint Stiffness] : joint stiffness [Negative] : Heme/Lymph [Fatigue] : no fatigue [Recent Change In Weight] : ~T no recent weight change [Shortness Of Breath] : no shortness of breath [Wheezing] : no wheezing [Cough] : no cough [Abdominal Pain] : no abdominal pain

## 2019-02-06 PROBLEM — E03.9 HYPOTHYROIDISM, UNSPECIFIED: Chronic | Status: ACTIVE | Noted: 2019-01-22

## 2019-02-06 PROBLEM — E66.01 MORBID (SEVERE) OBESITY DUE TO EXCESS CALORIES: Chronic | Status: ACTIVE | Noted: 2019-01-22

## 2019-02-06 PROBLEM — K21.9 GASTRO-ESOPHAGEAL REFLUX DISEASE WITHOUT ESOPHAGITIS: Chronic | Status: ACTIVE | Noted: 2019-01-22

## 2019-02-06 PROBLEM — E11.9 TYPE 2 DIABETES MELLITUS WITHOUT COMPLICATIONS: Chronic | Status: ACTIVE | Noted: 2019-01-22

## 2019-02-06 PROBLEM — J40 BRONCHITIS, NOT SPECIFIED AS ACUTE OR CHRONIC: Chronic | Status: ACTIVE | Noted: 2019-01-22

## 2019-02-06 PROBLEM — G47.33 OBSTRUCTIVE SLEEP APNEA (ADULT) (PEDIATRIC): Chronic | Status: ACTIVE | Noted: 2019-01-22

## 2019-02-13 ENCOUNTER — APPOINTMENT (OUTPATIENT)
Dept: BARIATRICS | Facility: CLINIC | Age: 44
End: 2019-02-13
Payer: COMMERCIAL

## 2019-02-13 ENCOUNTER — OUTPATIENT (OUTPATIENT)
Dept: OUTPATIENT SERVICES | Facility: HOSPITAL | Age: 44
LOS: 1 days | End: 2019-02-13
Payer: COMMERCIAL

## 2019-02-13 VITALS
TEMPERATURE: 99 F | WEIGHT: 293 LBS | HEART RATE: 102 BPM | RESPIRATION RATE: 16 BRPM | HEIGHT: 65 IN | SYSTOLIC BLOOD PRESSURE: 110 MMHG | OXYGEN SATURATION: 97 % | DIASTOLIC BLOOD PRESSURE: 80 MMHG

## 2019-02-13 VITALS
WEIGHT: 293 LBS | BODY MASS INDEX: 48.82 KG/M2 | SYSTOLIC BLOOD PRESSURE: 170 MMHG | OXYGEN SATURATION: 98 % | HEIGHT: 65 IN | HEART RATE: 106 BPM | DIASTOLIC BLOOD PRESSURE: 100 MMHG

## 2019-02-13 DIAGNOSIS — E66.01 MORBID (SEVERE) OBESITY DUE TO EXCESS CALORIES: ICD-10-CM

## 2019-02-13 DIAGNOSIS — Z98.890 OTHER SPECIFIED POSTPROCEDURAL STATES: Chronic | ICD-10-CM

## 2019-02-13 DIAGNOSIS — Z01.818 ENCOUNTER FOR OTHER PREPROCEDURAL EXAMINATION: ICD-10-CM

## 2019-02-13 DIAGNOSIS — G47.33 OBSTRUCTIVE SLEEP APNEA (ADULT) (PEDIATRIC): ICD-10-CM

## 2019-02-13 DIAGNOSIS — I10 ESSENTIAL (PRIMARY) HYPERTENSION: ICD-10-CM

## 2019-02-13 DIAGNOSIS — Z90.721 ACQUIRED ABSENCE OF OVARIES, UNILATERAL: Chronic | ICD-10-CM

## 2019-02-13 DIAGNOSIS — E11.9 TYPE 2 DIABETES MELLITUS WITHOUT COMPLICATIONS: ICD-10-CM

## 2019-02-13 DIAGNOSIS — D27.0 BENIGN NEOPLASM OF RIGHT OVARY: Chronic | ICD-10-CM

## 2019-02-13 LAB
ALBUMIN SERPL ELPH-MCNC: 3.2 G/DL — LOW (ref 3.3–5)
ALP SERPL-CCNC: 67 U/L — SIGNIFICANT CHANGE UP (ref 30–120)
ALT FLD-CCNC: 36 U/L DA — SIGNIFICANT CHANGE UP (ref 10–60)
ANION GAP SERPL CALC-SCNC: 14 MMOL/L — SIGNIFICANT CHANGE UP (ref 5–17)
AST SERPL-CCNC: 27 U/L — SIGNIFICANT CHANGE UP (ref 10–40)
BILIRUB SERPL-MCNC: 0.3 MG/DL — SIGNIFICANT CHANGE UP (ref 0.2–1.2)
BUN SERPL-MCNC: 13 MG/DL — SIGNIFICANT CHANGE UP (ref 7–23)
CALCIUM SERPL-MCNC: 9.2 MG/DL — SIGNIFICANT CHANGE UP (ref 8.4–10.5)
CHLORIDE SERPL-SCNC: 103 MMOL/L — SIGNIFICANT CHANGE UP (ref 96–108)
CO2 SERPL-SCNC: 28 MMOL/L — SIGNIFICANT CHANGE UP (ref 22–31)
CREAT SERPL-MCNC: 0.57 MG/DL — SIGNIFICANT CHANGE UP (ref 0.5–1.3)
GLUCOSE SERPL-MCNC: 138 MG/DL — HIGH (ref 70–99)
HBA1C BLD-MCNC: 8 % — HIGH (ref 4–5.6)
HCT VFR BLD CALC: 40.4 % — SIGNIFICANT CHANGE UP (ref 34.5–45)
HGB BLD-MCNC: 12.5 G/DL — SIGNIFICANT CHANGE UP (ref 11.5–15.5)
MCHC RBC-ENTMCNC: 24.6 PG — LOW (ref 27–34)
MCHC RBC-ENTMCNC: 30.9 GM/DL — LOW (ref 32–36)
MCV RBC AUTO: 79.4 FL — LOW (ref 80–100)
NRBC # BLD: 0 /100 WBCS — SIGNIFICANT CHANGE UP (ref 0–0)
PLATELET # BLD AUTO: 575 K/UL — HIGH (ref 150–400)
POTASSIUM SERPL-MCNC: 4.3 MMOL/L — SIGNIFICANT CHANGE UP (ref 3.5–5.3)
POTASSIUM SERPL-SCNC: 4.3 MMOL/L — SIGNIFICANT CHANGE UP (ref 3.5–5.3)
PROT SERPL-MCNC: 7.6 G/DL — SIGNIFICANT CHANGE UP (ref 6–8.3)
RBC # BLD: 5.09 M/UL — SIGNIFICANT CHANGE UP (ref 3.8–5.2)
RBC # FLD: 18.3 % — HIGH (ref 10.3–14.5)
SODIUM SERPL-SCNC: 145 MMOL/L — SIGNIFICANT CHANGE UP (ref 135–145)
WBC # BLD: 9.99 K/UL — SIGNIFICANT CHANGE UP (ref 3.8–10.5)
WBC # FLD AUTO: 9.99 K/UL — SIGNIFICANT CHANGE UP (ref 3.8–10.5)

## 2019-02-13 PROCEDURE — 80053 COMPREHEN METABOLIC PANEL: CPT

## 2019-02-13 PROCEDURE — G0463: CPT

## 2019-02-13 PROCEDURE — 86900 BLOOD TYPING SEROLOGIC ABO: CPT

## 2019-02-13 PROCEDURE — 86850 RBC ANTIBODY SCREEN: CPT

## 2019-02-13 PROCEDURE — 85027 COMPLETE CBC AUTOMATED: CPT

## 2019-02-13 PROCEDURE — 36415 COLL VENOUS BLD VENIPUNCTURE: CPT

## 2019-02-13 PROCEDURE — 86901 BLOOD TYPING SEROLOGIC RH(D): CPT

## 2019-02-13 PROCEDURE — 99214 OFFICE O/P EST MOD 30 MIN: CPT

## 2019-02-13 PROCEDURE — 83036 HEMOGLOBIN GLYCOSYLATED A1C: CPT

## 2019-02-13 RX ORDER — CLARITHROMYCIN 500 MG
1 TABLET ORAL
Qty: 0 | Refills: 0 | COMMUNITY

## 2019-02-13 RX ORDER — PREDNISONE 20 MG/1
20 TABLET ORAL
Refills: 0 | Status: DISCONTINUED | COMMUNITY

## 2019-02-13 RX ORDER — OMEPRAZOLE 20 MG/1
20 CAPSULE, DELAYED RELEASE ORAL DAILY
Qty: 30 | Refills: 2 | Status: COMPLETED | COMMUNITY
Start: 2019-01-23 | End: 2019-02-13

## 2019-02-13 RX ORDER — VALSARTAN AND HYDROCHLOROTHIAZIDE 160; 12.5 MG/1; MG/1
160-12.5 TABLET, FILM COATED ORAL
Qty: 90 | Refills: 1 | Status: DISCONTINUED | COMMUNITY
Start: 2018-11-16 | End: 2019-02-13

## 2019-02-13 RX ORDER — BUDESONIDE AND FORMOTEROL FUMARATE DIHYDRATE 160; 4.5 UG/1; UG/1
2 AEROSOL RESPIRATORY (INHALATION)
Qty: 0 | Refills: 0 | COMMUNITY

## 2019-02-13 RX ORDER — CLARITHROMYCIN 500 MG/1
500 TABLET, FILM COATED ORAL
Refills: 0 | Status: DISCONTINUED | COMMUNITY

## 2019-02-13 RX ORDER — LEVOTHYROXINE SODIUM 125 MCG
1 TABLET ORAL
Qty: 0 | Refills: 0 | COMMUNITY

## 2019-02-13 RX ORDER — BUDESONIDE AND FORMOTEROL FUMARATE DIHYDRATE 80; 4.5 UG/1; UG/1
80-4.5 AEROSOL RESPIRATORY (INHALATION)
Refills: 0 | Status: DISCONTINUED | COMMUNITY

## 2019-02-13 NOTE — H&P PST ADULT - HISTORY OF PRESENT ILLNESS
This is a 44 y/o female who presents to [facility] for pre op evaluation for bariatric surgery .she has struggled to loose weight all her life and has tried all diets, nutritional counseling and exercise without any success to maintain weight loss.  scheduled for laparoscopic sleeve gastrectomy on 1/30/18; surgery was cancelled due to bronchitis; rescheduled for 3/11/19

## 2019-02-13 NOTE — H&P PST ADULT - PMH
Abnormal vaginal bleeding  has irregular menses ususally on oral contraceptives to regulate  Bronchitis  recent bronchitis last week  GERD (gastroesophageal reflux disease)    Hypertension    Hypothyroid    Morbid obesity with BMI of 60.0-69.9, adult    ALAN on CPAP    Type 2 diabetes mellitus

## 2019-02-20 PROBLEM — N93.9 ABNORMAL UTERINE AND VAGINAL BLEEDING, UNSPECIFIED: Chronic | Status: ACTIVE | Noted: 2019-02-13

## 2019-02-25 ENCOUNTER — OUTPATIENT (OUTPATIENT)
Dept: OUTPATIENT SERVICES | Facility: HOSPITAL | Age: 44
LOS: 1 days | Discharge: HOME | End: 2019-02-25

## 2019-02-25 DIAGNOSIS — Z98.890 OTHER SPECIFIED POSTPROCEDURAL STATES: Chronic | ICD-10-CM

## 2019-02-25 DIAGNOSIS — Z90.721 ACQUIRED ABSENCE OF OVARIES, UNILATERAL: Chronic | ICD-10-CM

## 2019-02-25 DIAGNOSIS — D27.0 BENIGN NEOPLASM OF RIGHT OVARY: Chronic | ICD-10-CM

## 2019-02-25 DIAGNOSIS — D72.89 OTHER SPECIFIED DISORDERS OF WHITE BLOOD CELLS: ICD-10-CM

## 2019-02-27 NOTE — ASSESSMENT
[FreeTextEntry1] : 43 year old female with longstanding history of morbid obesity and scheduled for laparoscopic sleeve gastrectomy on Feb 2/25 presents today for preoperative visit. Since patient gained weight, she was informed that the surgery will need to be postponed. Nutrition and exercise guidelines were reviewed with the patient.  Patient was encouraged to lose weight prior to surgery. She will start preop modified liquid diet as of Monday up until surgery on 3/11.  Patient will attend preoperative education class.  Procedure and risks reviewed. All questions answered.\par \par Dr. Aldridge met with patient and agrees with postponing surgery. \par \par Reschedule surgery for 3/11\par Three week preoperative modified liquid diet\par PST today and Medical clearance\par Preoperative education class\par Follow up in 2 weeks for weight check\par Call with any questions or concerns\par

## 2019-02-27 NOTE — PHYSICAL EXAM
[Obese, well nourished, in no acute distress] : obese, well nourished, in no acute distress [Normal] : affect appropriate [de-identified] : EOMI, anicteric  [de-identified] : obese, soft, nontender, small nontender supraumbilical hernia.

## 2019-02-27 NOTE — REVIEW OF SYSTEMS
[Joint Stiffness] : joint stiffness [Negative] : Heme/Lymph [Recent Change In Weight] : ~T recent weight change [Fatigue] : no fatigue [Shortness Of Breath] : no shortness of breath [Wheezing] : no wheezing [Cough] : no cough [Abdominal Pain] : no abdominal pain [FreeTextEntry2] : Weight gain

## 2019-02-27 NOTE — HISTORY OF PRESENT ILLNESS
[de-identified] : 43 year old female with longstanding history of obesity undergoing workup for laparoscopic sleeve gastrectomy presents today for follow up visit. Patient is feeling better now. She gained weight since last visit, which she attributes to having menses for past two weeks and increased intake of fruits. Patient has history of menometrorrhagia that is controlled with oral contraceptives. However, she stopped the oral contraceptives in preparation for surgery.  She walks 7K steps daily.  Patient has PST today.

## 2019-03-01 ENCOUNTER — APPOINTMENT (OUTPATIENT)
Dept: BARIATRICS | Facility: CLINIC | Age: 44
End: 2019-03-01
Payer: COMMERCIAL

## 2019-03-01 VITALS
WEIGHT: 293 LBS | SYSTOLIC BLOOD PRESSURE: 152 MMHG | HEIGHT: 65 IN | DIASTOLIC BLOOD PRESSURE: 98 MMHG | BODY MASS INDEX: 48.82 KG/M2 | HEART RATE: 109 BPM | OXYGEN SATURATION: 96 %

## 2019-03-01 DIAGNOSIS — D47.3 ESSENTIAL (HEMORRHAGIC) THROMBOCYTHEMIA: ICD-10-CM

## 2019-03-01 PROCEDURE — 99214 OFFICE O/P EST MOD 30 MIN: CPT

## 2019-03-01 RX ORDER — CHLORHEXIDINE GLUCONATE 4 %
5 LIQUID (ML) TOPICAL AT BEDTIME
Refills: 0 | Status: DISCONTINUED | COMMUNITY
End: 2019-03-01

## 2019-03-01 RX ORDER — VALSARTAN AND HYDROCHLOROTHIAZIDE 160; 25 MG/1; MG/1
160-25 TABLET, FILM COATED ORAL
Refills: 0 | Status: DISCONTINUED | COMMUNITY
End: 2019-03-01

## 2019-03-04 ENCOUNTER — APPOINTMENT (OUTPATIENT)
Dept: HEMATOLOGY ONCOLOGY | Facility: CLINIC | Age: 44
End: 2019-03-04
Payer: COMMERCIAL

## 2019-03-04 ENCOUNTER — RESULT REVIEW (OUTPATIENT)
Age: 44
End: 2019-03-04

## 2019-03-04 ENCOUNTER — OUTPATIENT (OUTPATIENT)
Dept: OUTPATIENT SERVICES | Facility: HOSPITAL | Age: 44
LOS: 1 days | End: 2019-03-04
Payer: COMMERCIAL

## 2019-03-04 ENCOUNTER — LABORATORY RESULT (OUTPATIENT)
Age: 44
End: 2019-03-04

## 2019-03-04 VITALS
BODY MASS INDEX: 48.82 KG/M2 | WEIGHT: 293 LBS | DIASTOLIC BLOOD PRESSURE: 91 MMHG | HEIGHT: 65 IN | HEART RATE: 110 BPM | SYSTOLIC BLOOD PRESSURE: 144 MMHG | RESPIRATION RATE: 17 BRPM | OXYGEN SATURATION: 96 % | TEMPERATURE: 98.7 F

## 2019-03-04 DIAGNOSIS — D47.3 ESSENTIAL (HEMORRHAGIC) THROMBOCYTHEMIA: ICD-10-CM

## 2019-03-04 DIAGNOSIS — Z98.890 OTHER SPECIFIED POSTPROCEDURAL STATES: Chronic | ICD-10-CM

## 2019-03-04 DIAGNOSIS — Z90.721 ACQUIRED ABSENCE OF OVARIES, UNILATERAL: Chronic | ICD-10-CM

## 2019-03-04 DIAGNOSIS — D27.0 BENIGN NEOPLASM OF RIGHT OVARY: Chronic | ICD-10-CM

## 2019-03-04 LAB
BASOPHILS # BLD AUTO: 0.1 K/UL — SIGNIFICANT CHANGE UP (ref 0–0.2)
BASOPHILS NFR BLD AUTO: 0.9 % — SIGNIFICANT CHANGE UP (ref 0–2)
EOSINOPHIL # BLD AUTO: 0.3 K/UL — SIGNIFICANT CHANGE UP (ref 0–0.5)
EOSINOPHIL NFR BLD AUTO: 2.2 % — SIGNIFICANT CHANGE UP (ref 0–6)
ERYTHROCYTE [SEDIMENTATION RATE] IN BLOOD: 48 MM/HR — HIGH (ref 0–15)
HCT VFR BLD CALC: 37.8 % — SIGNIFICANT CHANGE UP (ref 34.5–45)
HGB BLD-MCNC: 12.9 G/DL — SIGNIFICANT CHANGE UP (ref 11.5–15.5)
LYMPHOCYTES # BLD AUTO: 25.3 % — SIGNIFICANT CHANGE UP (ref 13–44)
LYMPHOCYTES # BLD AUTO: 3.2 K/UL — SIGNIFICANT CHANGE UP (ref 1–3.3)
MCHC RBC-ENTMCNC: 25.4 PG — LOW (ref 27–34)
MCHC RBC-ENTMCNC: 34.2 G/DL — SIGNIFICANT CHANGE UP (ref 32–36)
MCV RBC AUTO: 74.3 FL — LOW (ref 80–100)
MONOCYTES # BLD AUTO: 0.8 K/UL — SIGNIFICANT CHANGE UP (ref 0–0.9)
MONOCYTES NFR BLD AUTO: 6.2 % — SIGNIFICANT CHANGE UP (ref 2–14)
NEUTROPHILS # BLD AUTO: 8.2 K/UL — HIGH (ref 1.8–7.4)
NEUTROPHILS NFR BLD AUTO: 65.3 % — SIGNIFICANT CHANGE UP (ref 43–77)
PLATELET # BLD AUTO: 443 K/UL — HIGH (ref 150–400)
RBC # BLD: 5.08 M/UL — SIGNIFICANT CHANGE UP (ref 3.8–5.2)
RBC # FLD: 17.2 % — HIGH (ref 10.3–14.5)
WBC # BLD: 12.5 K/UL — HIGH (ref 3.8–10.5)
WBC # FLD AUTO: 12.5 K/UL — HIGH (ref 3.8–10.5)

## 2019-03-04 PROCEDURE — 99244 OFF/OP CNSLTJ NEW/EST MOD 40: CPT

## 2019-03-04 PROCEDURE — 81207 BCR/ABL1 GENE MINOR BP: CPT

## 2019-03-04 PROCEDURE — 81206 BCR/ABL1 GENE MAJOR BP: CPT

## 2019-03-04 PROCEDURE — G0452: CPT | Mod: 26

## 2019-03-04 NOTE — ASSESSMENT
[FreeTextEntry1] : 42yo F w/ DM, HTN, obesity, former smoker, here for evaluation of leukocytosis and thrombocytosis prior to laparoscopic sleeve gastrectomy on 3/11/19. \par Will check ESR/CRP to assess leukocytosis. Less likely CML but will check BCR/ABL. Thrombocytosis likely reactive, may be also secondary to iron deficiency anemia. Will repeat iron studies today but expect them to be low as she has not been on iron supplements and she is having her menses. \par No hematologic contraindications to surgery. She will require iron supplementation postop. DVT prophylaxis as per surgery protocol.\par RTC 1-2 mo

## 2019-03-04 NOTE — HISTORY OF PRESENT ILLNESS
[de-identified] : 44yo F w/ DM, HTN, obesity, former smoker, here for evaluation prior to laparoscopic sleeve gastrectomy on 3/11/19. \par \par She had bronchitis mid January, was placed on a 10d course of steroids. This caused her WBC count to increase up to 21. It has now improved back to baseline. \par Also, she is iron deficient from labs in 11/2018. She notes that she had her "period for a year" from 1/2017-1/2018. She was started on iron supplements beginning of 2018, completed 2 months. Menses were regulated on OCP since then. She was taken off OCP 2 months ago for the surgery - currently having menses. \par

## 2019-03-05 ENCOUNTER — APPOINTMENT (OUTPATIENT)
Dept: BARIATRICS | Facility: CLINIC | Age: 44
End: 2019-03-05

## 2019-03-05 RX ORDER — HYOSCYAMINE SULFATE 0.13 MG
0.12 TABLET ORAL EVERY 6 HOURS
Qty: 0 | Refills: 0 | Status: DISCONTINUED | OUTPATIENT
Start: 2019-03-11 | End: 2019-03-12

## 2019-03-05 RX ORDER — ONDANSETRON 8 MG/1
4 TABLET, FILM COATED ORAL EVERY 6 HOURS
Qty: 0 | Refills: 0 | Status: DISCONTINUED | OUTPATIENT
Start: 2019-03-11 | End: 2019-03-12

## 2019-03-05 RX ORDER — ENOXAPARIN SODIUM 100 MG/ML
40 INJECTION SUBCUTANEOUS EVERY 12 HOURS
Qty: 0 | Refills: 0 | Status: DISCONTINUED | OUTPATIENT
Start: 2019-03-11 | End: 2019-03-12

## 2019-03-05 RX ORDER — SODIUM CHLORIDE 9 MG/ML
1000 INJECTION, SOLUTION INTRAVENOUS
Qty: 0 | Refills: 0 | Status: DISCONTINUED | OUTPATIENT
Start: 2019-03-11 | End: 2019-03-12

## 2019-03-05 RX ORDER — GLUCAGON INJECTION, SOLUTION 0.5 MG/.1ML
1 INJECTION, SOLUTION SUBCUTANEOUS ONCE
Qty: 0 | Refills: 0 | Status: DISCONTINUED | OUTPATIENT
Start: 2019-03-11 | End: 2019-03-12

## 2019-03-05 RX ORDER — METOPROLOL TARTRATE 50 MG
5 TABLET ORAL EVERY 6 HOURS
Qty: 0 | Refills: 0 | Status: DISCONTINUED | OUTPATIENT
Start: 2019-03-11 | End: 2019-03-12

## 2019-03-05 RX ORDER — DEXTROSE 50 % IN WATER 50 %
25 SYRINGE (ML) INTRAVENOUS ONCE
Qty: 0 | Refills: 0 | Status: DISCONTINUED | OUTPATIENT
Start: 2019-03-11 | End: 2019-03-12

## 2019-03-05 RX ORDER — PANTOPRAZOLE SODIUM 20 MG/1
40 TABLET, DELAYED RELEASE ORAL DAILY
Qty: 0 | Refills: 0 | Status: DISCONTINUED | OUTPATIENT
Start: 2019-03-11 | End: 2019-03-12

## 2019-03-05 RX ORDER — DEXTROSE 50 % IN WATER 50 %
12.5 SYRINGE (ML) INTRAVENOUS ONCE
Qty: 0 | Refills: 0 | Status: DISCONTINUED | OUTPATIENT
Start: 2019-03-11 | End: 2019-03-12

## 2019-03-05 RX ORDER — DEXTROSE 50 % IN WATER 50 %
15 SYRINGE (ML) INTRAVENOUS ONCE
Qty: 0 | Refills: 0 | Status: DISCONTINUED | OUTPATIENT
Start: 2019-03-11 | End: 2019-03-12

## 2019-03-05 RX ORDER — HYDROMORPHONE HYDROCHLORIDE 2 MG/ML
0.5 INJECTION INTRAMUSCULAR; INTRAVENOUS; SUBCUTANEOUS EVERY 4 HOURS
Qty: 0 | Refills: 0 | Status: DISCONTINUED | OUTPATIENT
Start: 2019-03-11 | End: 2019-03-12

## 2019-03-06 PROBLEM — D47.3 THROMBOCYTOSIS: Status: ACTIVE | Noted: 2019-03-04

## 2019-03-06 LAB
CRP SERPL-MCNC: 1.94 MG/DL
FERRITIN SERPL-MCNC: 16 NG/ML
IRON SATN MFR SERPL: 7 %
IRON SERPL-MCNC: 35 UG/DL
TIBC SERPL-MCNC: 506 UG/DL
UIBC SERPL-MCNC: 471 UG/DL

## 2019-03-07 RX ORDER — OMEPRAZOLE 20 MG/1
20 CAPSULE, DELAYED RELEASE ORAL DAILY
Qty: 30 | Refills: 2 | Status: ACTIVE | COMMUNITY
Start: 2019-03-07 | End: 1900-01-01

## 2019-03-07 NOTE — ASSESSMENT
[FreeTextEntry1] : 43 year old female with longstanding history of morbid obesity completed workup for laparoscopic sleeve gastrectomy and is scheduled for laparoscopic sleeve gastrectomy on 3/11. Patient was encouraged to continue to lose weight prior to surgery. Patient will remain on preoperative modified liquid diet and start full liquids two days prior to surgery. Since thrombocytosis was still present at last PST, she will get hematology clearance next week.  Nutrition and exercise guidelines were reviewed with the patient. Procedure risks and benefits were again discussed with patient. All questions were answered.\par \par Schedule surgery date\par Continue preoperative modified liquid diet\par Hematology Clearance\par Medical clearance\par Call if any questions or concerns.

## 2019-03-07 NOTE — PHYSICAL EXAM
[Obese, well nourished, in no acute distress] : obese, well nourished, in no acute distress [Normal] : affect appropriate [de-identified] : EOMI, anicteric  [de-identified] : obese, soft, nontender, small nontender supraumbilical hernia.

## 2019-03-07 NOTE — REVIEW OF SYSTEMS
[Recent Change In Weight] : ~T recent weight change [Joint Stiffness] : joint stiffness [Negative] : Heme/Lymph [Fatigue] : no fatigue [Shortness Of Breath] : no shortness of breath [Wheezing] : no wheezing [Cough] : no cough [Abdominal Pain] : no abdominal pain [FreeTextEntry2] : Weight loss

## 2019-03-07 NOTE — HISTORY OF PRESENT ILLNESS
[de-identified] : 43 year old female with longstanding history of morbid obesity undergoing workup for laparoscopic sleeve gastrectomy presents today for preoperative visit. Patient lost weight since last visit. Patient was rescheduled twice for surgery secondary to recent high dose steroids for illness and subsequent weight gain. At last PST, she was found to have persistently elevated platelets and has appointment with hematologist next week. She continue to have menometrorrhagia.  She started a modified liquid diet as of last visit. She continues to walk frequently. Significant postoperative nausea and denies urinary retention

## 2019-03-08 LAB — BCR/ABL BY RT - PCR QUANTITATIVE: SIGNIFICANT CHANGE UP

## 2019-03-10 ENCOUNTER — TRANSCRIPTION ENCOUNTER (OUTPATIENT)
Age: 44
End: 2019-03-10

## 2019-03-11 ENCOUNTER — RESULT REVIEW (OUTPATIENT)
Age: 44
End: 2019-03-11

## 2019-03-11 ENCOUNTER — INPATIENT (INPATIENT)
Facility: HOSPITAL | Age: 44
LOS: 0 days | Discharge: HOME | DRG: 621 | End: 2019-03-12
Attending: SURGERY | Admitting: SURGERY
Payer: COMMERCIAL

## 2019-03-11 ENCOUNTER — APPOINTMENT (OUTPATIENT)
Dept: BARIATRICS | Facility: HOSPITAL | Age: 44
End: 2019-03-11
Payer: COMMERCIAL

## 2019-03-11 VITALS
DIASTOLIC BLOOD PRESSURE: 94 MMHG | HEART RATE: 101 BPM | HEIGHT: 65 IN | SYSTOLIC BLOOD PRESSURE: 157 MMHG | TEMPERATURE: 97 F | RESPIRATION RATE: 73 BRPM | WEIGHT: 293 LBS | OXYGEN SATURATION: 98 %

## 2019-03-11 DIAGNOSIS — E11.9 TYPE 2 DIABETES MELLITUS WITHOUT COMPLICATIONS: ICD-10-CM

## 2019-03-11 DIAGNOSIS — Z01.818 ENCOUNTER FOR OTHER PREPROCEDURAL EXAMINATION: ICD-10-CM

## 2019-03-11 DIAGNOSIS — G47.33 OBSTRUCTIVE SLEEP APNEA (ADULT) (PEDIATRIC): ICD-10-CM

## 2019-03-11 DIAGNOSIS — Z98.890 OTHER SPECIFIED POSTPROCEDURAL STATES: Chronic | ICD-10-CM

## 2019-03-11 DIAGNOSIS — E66.01 MORBID (SEVERE) OBESITY DUE TO EXCESS CALORIES: ICD-10-CM

## 2019-03-11 DIAGNOSIS — I10 ESSENTIAL (PRIMARY) HYPERTENSION: ICD-10-CM

## 2019-03-11 DIAGNOSIS — Z90.721 ACQUIRED ABSENCE OF OVARIES, UNILATERAL: Chronic | ICD-10-CM

## 2019-03-11 DIAGNOSIS — D27.0 BENIGN NEOPLASM OF RIGHT OVARY: Chronic | ICD-10-CM

## 2019-03-11 DIAGNOSIS — K21.9 GASTRO-ESOPHAGEAL REFLUX DISEASE WITHOUT ESOPHAGITIS: ICD-10-CM

## 2019-03-11 DIAGNOSIS — E03.9 HYPOTHYROIDISM, UNSPECIFIED: ICD-10-CM

## 2019-03-11 LAB
BLD GP AB SCN SERPL QL: SIGNIFICANT CHANGE UP
GLUCOSE BLDC GLUCOMTR-MCNC: 155 MG/DL — HIGH (ref 70–99)
GLUCOSE BLDC GLUCOMTR-MCNC: 171 MG/DL — HIGH (ref 70–99)
GLUCOSE BLDC GLUCOMTR-MCNC: 211 MG/DL — HIGH (ref 70–99)
HCG UR QL: NEGATIVE — SIGNIFICANT CHANGE UP

## 2019-03-11 PROCEDURE — 88305 TISSUE EXAM BY PATHOLOGIST: CPT | Mod: 26

## 2019-03-11 PROCEDURE — 71045 X-RAY EXAM CHEST 1 VIEW: CPT | Mod: 26,76

## 2019-03-11 PROCEDURE — 43775 LAP SLEEVE GASTRECTOMY: CPT

## 2019-03-11 PROCEDURE — 43775 LAP SLEEVE GASTRECTOMY: CPT | Mod: AS

## 2019-03-11 RX ORDER — IBUPROFEN 200 MG
800 TABLET ORAL EVERY 6 HOURS
Qty: 0 | Refills: 0 | Status: DISCONTINUED | OUTPATIENT
Start: 2019-03-11 | End: 2019-03-12

## 2019-03-11 RX ORDER — ONDANSETRON 8 MG/1
4 TABLET, FILM COATED ORAL ONCE
Qty: 0 | Refills: 0 | Status: COMPLETED | OUTPATIENT
Start: 2019-03-11 | End: 2019-03-11

## 2019-03-11 RX ORDER — SODIUM CHLORIDE 9 MG/ML
1000 INJECTION, SOLUTION INTRAVENOUS
Qty: 0 | Refills: 0 | Status: DISCONTINUED | OUTPATIENT
Start: 2019-03-11 | End: 2019-03-11

## 2019-03-11 RX ORDER — APREPITANT 80 MG/1
40 CAPSULE ORAL ONCE
Qty: 0 | Refills: 0 | Status: COMPLETED | OUTPATIENT
Start: 2019-03-11 | End: 2019-03-11

## 2019-03-11 RX ORDER — AZTREONAM 2 G
2000 VIAL (EA) INJECTION ONCE
Qty: 0 | Refills: 0 | Status: COMPLETED | OUTPATIENT
Start: 2019-03-11 | End: 2019-03-11

## 2019-03-11 RX ORDER — LEVOTHYROXINE SODIUM 125 MCG
88 TABLET ORAL DAILY
Qty: 0 | Refills: 0 | Status: DISCONTINUED | OUTPATIENT
Start: 2019-03-12 | End: 2019-03-12

## 2019-03-11 RX ORDER — INSULIN LISPRO 100/ML
VIAL (ML) SUBCUTANEOUS
Qty: 0 | Refills: 0 | Status: DISCONTINUED | OUTPATIENT
Start: 2019-03-11 | End: 2019-03-12

## 2019-03-11 RX ORDER — CHLORHEXIDINE GLUCONATE 213 G/1000ML
1 SOLUTION TOPICAL ONCE
Qty: 0 | Refills: 0 | Status: COMPLETED | OUTPATIENT
Start: 2019-03-11 | End: 2019-03-11

## 2019-03-11 RX ORDER — INSULIN LISPRO 100/ML
VIAL (ML) SUBCUTANEOUS
Qty: 0 | Refills: 0 | Status: DISCONTINUED | OUTPATIENT
Start: 2019-03-11 | End: 2019-03-11

## 2019-03-11 RX ORDER — ACETAMINOPHEN 500 MG
1000 TABLET ORAL ONCE
Qty: 0 | Refills: 0 | Status: COMPLETED | OUTPATIENT
Start: 2019-03-11 | End: 2019-03-11

## 2019-03-11 RX ORDER — HYDROMORPHONE HYDROCHLORIDE 2 MG/ML
0.5 INJECTION INTRAMUSCULAR; INTRAVENOUS; SUBCUTANEOUS
Qty: 0 | Refills: 0 | Status: DISCONTINUED | OUTPATIENT
Start: 2019-03-11 | End: 2019-03-11

## 2019-03-11 RX ORDER — ACETAMINOPHEN 500 MG
1000 TABLET ORAL EVERY 6 HOURS
Qty: 0 | Refills: 0 | Status: DISCONTINUED | OUTPATIENT
Start: 2019-03-12 | End: 2019-03-12

## 2019-03-11 RX ORDER — ENOXAPARIN SODIUM 100 MG/ML
40 INJECTION SUBCUTANEOUS ONCE
Qty: 0 | Refills: 0 | Status: COMPLETED | OUTPATIENT
Start: 2019-03-11 | End: 2019-03-11

## 2019-03-11 RX ORDER — ACETAMINOPHEN 500 MG
1000 TABLET ORAL EVERY 6 HOURS
Qty: 0 | Refills: 0 | Status: COMPLETED | OUTPATIENT
Start: 2019-03-11 | End: 2019-03-12

## 2019-03-11 RX ADMIN — HYDROMORPHONE HYDROCHLORIDE 0.5 MILLIGRAM(S): 2 INJECTION INTRAMUSCULAR; INTRAVENOUS; SUBCUTANEOUS at 22:16

## 2019-03-11 RX ADMIN — SODIUM CHLORIDE 100 MILLILITER(S): 9 INJECTION, SOLUTION INTRAVENOUS at 15:00

## 2019-03-11 RX ADMIN — Medication 800 MILLIGRAM(S): at 15:12

## 2019-03-11 RX ADMIN — SODIUM CHLORIDE 150 MILLILITER(S): 9 INJECTION, SOLUTION INTRAVENOUS at 22:16

## 2019-03-11 RX ADMIN — PANTOPRAZOLE SODIUM 40 MILLIGRAM(S): 20 TABLET, DELAYED RELEASE ORAL at 18:44

## 2019-03-11 RX ADMIN — APREPITANT 40 MILLIGRAM(S): 80 CAPSULE ORAL at 11:42

## 2019-03-11 RX ADMIN — HYDROMORPHONE HYDROCHLORIDE 0.5 MILLIGRAM(S): 2 INJECTION INTRAMUSCULAR; INTRAVENOUS; SUBCUTANEOUS at 14:45

## 2019-03-11 RX ADMIN — ENOXAPARIN SODIUM 40 MILLIGRAM(S): 100 INJECTION SUBCUTANEOUS at 11:43

## 2019-03-11 RX ADMIN — Medication 400 MILLIGRAM(S): at 18:40

## 2019-03-11 RX ADMIN — HYDROMORPHONE HYDROCHLORIDE 0.5 MILLIGRAM(S): 2 INJECTION INTRAMUSCULAR; INTRAVENOUS; SUBCUTANEOUS at 23:02

## 2019-03-11 RX ADMIN — Medication 2: at 18:45

## 2019-03-11 RX ADMIN — Medication 1000 MILLIGRAM(S): at 19:23

## 2019-03-11 RX ADMIN — ONDANSETRON 4 MILLIGRAM(S): 8 TABLET, FILM COATED ORAL at 18:03

## 2019-03-11 RX ADMIN — CHLORHEXIDINE GLUCONATE 1 APPLICATION(S): 213 SOLUTION TOPICAL at 11:43

## 2019-03-11 RX ADMIN — Medication 5 MILLIGRAM(S): at 23:57

## 2019-03-11 RX ADMIN — HYDROMORPHONE HYDROCHLORIDE 0.5 MILLIGRAM(S): 2 INJECTION INTRAMUSCULAR; INTRAVENOUS; SUBCUTANEOUS at 16:00

## 2019-03-11 RX ADMIN — HYDROMORPHONE HYDROCHLORIDE 0.5 MILLIGRAM(S): 2 INJECTION INTRAMUSCULAR; INTRAVENOUS; SUBCUTANEOUS at 15:19

## 2019-03-11 RX ADMIN — ENOXAPARIN SODIUM 40 MILLIGRAM(S): 100 INJECTION SUBCUTANEOUS at 23:02

## 2019-03-11 RX ADMIN — ONDANSETRON 4 MILLIGRAM(S): 8 TABLET, FILM COATED ORAL at 16:00

## 2019-03-11 RX ADMIN — Medication 5 MILLIGRAM(S): at 18:40

## 2019-03-11 RX ADMIN — Medication 516 MILLIGRAM(S): at 14:42

## 2019-03-11 RX ADMIN — ONDANSETRON 4 MILLIGRAM(S): 8 TABLET, FILM COATED ORAL at 22:22

## 2019-03-11 NOTE — PROGRESS NOTE ADULT - ASSESSMENT
44 y/o female with ALAN on CPAP, HTN, DM2, hypothyroid, obese presents to [facility] for elective bariatric surgery, underwent laparoscopic gastric sleeve on 3/11. POD#0    Problem list:    1) post-op bariatric surgery POD#0  2) DM2  3) HTN  4) ALAN  5) hypothyroidism       NEURO: stable, no complaints of pain at this time, pain medication as need, ordered    CV: HTN: On valsartan/HCTZ at home, would restart once able to take diet.     PULM: ALAN, CPAP ordered from overnight CPAP of 10    GI: advance diet as per surgery. GERD: PPI daily     RENAL: stable     ID: no active issues     ENDO: DM2: increase sliding scale to moderate, continue synthroid     HEME: DVT-P: lovenox, compression boots, ambulation    DISPO: if stable overnight, downgrade, has right IJ triple lumen for access issues, remove once able to get PIV 42 y/o female with ALAN on CPAP, HTN, DM2, hypothyroid, obese presents to [facility] for elective bariatric surgery, underwent laparoscopic gastric sleeve on 3/11. POD#0    Problem list:    1) post-op bariatric surgery POD#0  2) DM2  3) HTN  4) ALAN  5) hypothyroidism       NEURO: stable, no complaints of pain at this time, pain medication as need, ordered    CV: HTN: On valsartan/HCTZ at home, would restart once able to take diet, and d/c IVP lopressor.     PULM: ALAN, CPAP ordered from overnight CPAP of 10    GI: advance diet as per surgery. GERD: PPI daily     RENAL: stable     ID: no active issues     ENDO: DM2: increase sliding scale to moderate, continue synthroid     HEME: DVT-P: lovenox, compression boots, ambulation    DISPO: if stable overnight, downgrade, has right IJ triple lumen for access issues, remove once able to get PIV 42 y/o female with ALNA on CPAP, HTN, DM2, hypothyroid, obese presents to [facility] for elective bariatric surgery, underwent laparoscopic gastric sleeve on 3/11. POD#0    Problem list:    1) post-op bariatric surgery POD#0  2) DM2  3) HTN  4) ALAN  5) hypothyroidism       NEURO: stable, no complaints of pain at this time, pain medication as need, ordered    CV: HTN: On valsartan/HCTZ at home, would restart once able to take diet, and d/c IVP lopressor.     PULM: ALAN, CPAP ordered from overnight CPAP of 10    GI: advance diet as per surgery. GERD: PPI daily     RENAL: stable, check BMP, mag, phos in AM    ID: no active issues     ENDO: DM2: increase sliding scale to moderate, continue synthroid     HEME: DVT-P: lovenox, compression boots, ambulation    DISPO: if stable overnight, downgrade, has right IJ triple lumen for access issues, remove once able to get PIV

## 2019-03-11 NOTE — PROGRESS NOTE ADULT - SUBJECTIVE AND OBJECTIVE BOX
Patient is a 43y old  Female who presents with a chief complaint of     BRIEF HOSPITAL COURSE: 44 y/o female with ALAN on CPAP, HTN, DM2, hypothyroid, obese     Events last 24 hours: ***    PAST MEDICAL & SURGICAL HISTORY:  Abnormal vaginal bleeding: has irregular menses ususally on oral contraceptives to regulate  Morbid obesity with BMI of 60.0-69.9, adult  ALAN on CPAP  GERD (gastroesophageal reflux disease)  Bronchitis: recent bronchitis last week  Type 2 diabetes mellitus  Hypothyroid  Hypertension  S/P dilation and curettage: 2017 x2  History of arthroscopy of right shoulder  H/O oophorectomy: right  Benign teratoma of ovary, right      Review of Systems:  CONSTITUTIONAL: No fever, chills, or fatigue  EYES: No eye pain, visual disturbances, or discharge  ENMT:  No difficulty hearing, tinnitus, vertigo; No sinus or throat pain  NECK: No pain or stiffness  RESPIRATORY: No cough, wheezing, chills or hemoptysis; No shortness of breath  CARDIOVASCULAR: No chest pain, palpitations, dizziness, or leg swelling  GASTROINTESTINAL: No abdominal or epigastric pain. No nausea, vomiting, or hematemesis; No diarrhea or constipation. No melena or hematochezia.  GENITOURINARY: No dysuria, frequency, hematuria, or incontinence  NEUROLOGICAL: No headaches, memory loss, loss of strength, numbness, or tremors  SKIN: No itching, burning, rashes, or lesions   MUSCULOSKELETAL: No joint pain or swelling; No muscle, back, or extremity pain  PSYCHIATRIC: No depression, anxiety, mood swings, or difficulty sleeping      Medications:    metoprolol tartrate Injectable 5 milliGRAM(s) IV Push every 6 hours      acetaminophen  IVPB .. 1000 milliGRAM(s) IV Intermittent every 6 hours  HYDROmorphone  Injectable 0.5 milliGRAM(s) IV Push every 4 hours PRN  ibuprofen IVPB .. 800 milliGRAM(s) IV Intermittent every 6 hours PRN  ondansetron Injectable 4 milliGRAM(s) IV Push every 6 hours      enoxaparin Injectable 40 milliGRAM(s) SubCutaneous every 12 hours    hyoscyamine SL 0.125 milliGRAM(s) SubLingual every 6 hours PRN  pantoprazole  Injectable 40 milliGRAM(s) IV Push daily      dextrose 40% Gel 15 Gram(s) Oral once PRN  dextrose 50% Injectable 12.5 Gram(s) IV Push once  dextrose 50% Injectable 25 Gram(s) IV Push once  dextrose 50% Injectable 25 Gram(s) IV Push once  glucagon  Injectable 1 milliGRAM(s) IntraMuscular once PRN  insulin lispro (HumaLOG) corrective regimen sliding scale   SubCutaneous three times a day before meals    dextrose 5%. 1000 milliLiter(s) IV Continuous <Continuous>  lactated ringers. 1000 milliLiter(s) IV Continuous <Continuous>                ICU Vital Signs Last 24 Hrs  T(C): 36.8 (11 Mar 2019 20:05), Max: 36.8 (11 Mar 2019 18:45)  T(F): 98.2 (11 Mar 2019 20:05), Max: 98.3 (11 Mar 2019 18:45)  HR: 80 (11 Mar 2019 23:00) (80 - 111)  BP: 123/75 (11 Mar 2019 23:00) (123/75 - 180/85)  BP(mean): 89 (11 Mar 2019 23:00) (89 - 100)  ABP: --  ABP(mean): --  RR: 17 (11 Mar 2019 23:00) (15 - 73)  SpO2: 98% (11 Mar 2019 23:00) (92% - 98%)          I&O's Detail    11 Mar 2019 07:01  -  11 Mar 2019 23:12  --------------------------------------------------------  IN:    IV PiggyBack: 350 mL    lactated ringers.: 625 mL    lactated ringers.: 750 mL    lactated ringers.: 1800 mL  Total IN: 3525 mL    OUT:  Total OUT: 0 mL    Total NET: 3525 mL            LABS:                CAPILLARY BLOOD GLUCOSE      POCT Blood Glucose.: 171 mg/dL (11 Mar 2019 23:08)        CULTURES:      Physical Examination:    General: No acute distress.  Alert, oriented, interactive, nonfocal    HEENT: Pupils equal, reactive to light.  Symmetric.    PULM: Clear to auscultation bilaterally, no significant sputum production    CVS: Regular rate and rhythm, no murmurs, rubs, or gallops    ABD: Soft, nondistended, nontender, normoactive bowel sounds, no masses    EXT: No edema, nontender    SKIN: Warm and well perfused, no rashes noted.    NEURO: A&Ox3, strength 5/5 all extremities, cranial nerves grossly intact, no focal deficits      RADIOLOGY: ***      CRITICAL CARE TIME SPENT: ***  Evaluating/treating patient, reviewing data/labs/imaging, discussing case with multidisciplinary team, discussing plan/goals of care with patient/family. Non-inclusive of procedure time. Patient is a 43y old  Female who presents with a chief complaint of     BRIEF HOSPITAL COURSE: 44 y/o female with ALAN on CPAP, HTN, DM2, hypothyroid, obese presents to [facility] for elective bariatric surgery, underwent laparoscopic gastric sleeve on 3/11. POD#0    Events last 24 hours: POD#0, tolerated procedure well. Feels more awake now, tolerating ice chips and sips, belching, no flatus or BM.    PAST MEDICAL & SURGICAL HISTORY:  Abnormal vaginal bleeding: has irregular menses usually on oral contraceptives to regulate  Morbid obesity with BMI of 60.0-69.9, adult  ALAN on CPAP  GERD (gastroesophageal reflux disease)  Bronchitis: recent bronchitis last week  Type 2 diabetes mellitus  Hypothyroid  Hypertension  S/P dilation and curettage: 2017 x2  History of arthroscopy of right shoulder  H/O oophorectomy: right  Benign teratoma of ovary, right      Review of Systems:  CONSTITUTIONAL: No fever, chills, or fatigue  EYES: No eye pain, visual disturbances, or discharge  ENMT:  No difficulty hearing, tinnitus, vertigo; No sinus or throat pain  NECK: No pain or stiffness  RESPIRATORY: No cough, wheezing, chills or hemoptysis; No shortness of breath  CARDIOVASCULAR: No chest pain, palpitations, dizziness, or leg swelling  GASTROINTESTINAL: +abdominal pain No epigastric pain. No nausea, vomiting, or hematemesis; No diarrhea or constipation. No melena or hematochezia.  GENITOURINARY: No dysuria, frequency, hematuria, or incontinence  NEUROLOGICAL: No headaches, memory loss, loss of strength, numbness, or tremors  SKIN: No itching, burning, rashes, or lesions   MUSCULOSKELETAL: No joint pain or swelling; No muscle, back, or extremity pain  PSYCHIATRIC: No depression, anxiety, mood swings, or difficulty sleeping      Medications:    metoprolol tartrate Injectable 5 milliGRAM(s) IV Push every 6 hours  acetaminophen  IVPB .. 1000 milliGRAM(s) IV Intermittent every 6 hours  HYDROmorphone  Injectable 0.5 milliGRAM(s) IV Push every 4 hours PRN  ibuprofen IVPB .. 800 milliGRAM(s) IV Intermittent every 6 hours PRN  ondansetron Injectable 4 milliGRAM(s) IV Push every 6 hours  enoxaparin Injectable 40 milliGRAM(s) SubCutaneous every 12 hours  hyoscyamine SL 0.125 milliGRAM(s) SubLingual every 6 hours PRN  pantoprazole  Injectable 40 milliGRAM(s) IV Push daily  dextrose 40% Gel 15 Gram(s) Oral once PRN  dextrose 50% Injectable 12.5 Gram(s) IV Push once  dextrose 50% Injectable 25 Gram(s) IV Push once  dextrose 50% Injectable 25 Gram(s) IV Push once  glucagon  Injectable 1 milliGRAM(s) IntraMuscular once PRN  insulin lispro (HumaLOG) corrective regimen sliding scale   SubCutaneous three times a day before meal  dextrose 5%. 1000 milliLiter(s) IV Continuous <Continuous>  lactated ringers. 1000 milliLiter(s) IV Continuous <Continuous>      ICU Vital Signs  T(C): 36.8  T(F): 98.2   HR: 80   BP: 123/75   BP(mean): 89   RR: 17   SpO2: 98%       I&O's Detail    11 Mar 2019 07:01  -  11 Mar 2019 23:12  --------------------------------------------------------  IN:    IV PiggyBack: 350 mL    lactated ringers.: 625 mL    lactated ringers.: 750 mL    lactated ringers.: 1800 mL  Total IN: 3525 mL    OUT:  Total OUT: 0 mL    Total NET: 3525 mL        LABS:        CAPILLARY BLOOD GLUCOSE      POCT Blood Glucose.: 171 mg/dL (11 Mar 2019 23:08)        CULTURES:      Physical Examination:    General: Obese, groggy, alert, oriented, able to ambulate around the unit     HEENT: Pupils equal, reactive to light.  Symmetric.    PULM: Clear to auscultation bilaterally, no significant sputum production    CVS: Regular rate and rhythm, no murmurs, rubs, or gallops    ABD: Soft, nondistended, nontender, normoactive bowel sounds, no masses, 5 laparoscopic scars, appears to be healing well, no bleeding     EXT: No edema, nontender    SKIN: Warm and well perfused, no rashes noted.    NEURO: A&Ox3, strength 5/5 all extremities, cranial nerves grossly intact, no focal deficits      RADIOLOGY: EXAM:  XR CHEST PORTABLE URGENT 1V                              PROCEDURE DATE:  03/11/2019          INTERPRETATION:  Single AP view of the chest.    CLINICAL INFORMATION: Central line placement    FINDINGS AND   IMPRESSION:  There is a right internal jugular central line with the tip   in the region of the cavoatrial junction. Heart size cannot be accurately   evaluated in this projection. The lungs are grossly clear. There are mild   degenerative changes of the thoracic spine.        CARLOTA HENSON M.D., ATTENDING RADIOLOGIST  This document has been electronically signed. Mar 11 2019  1:07PM        TIME SPENT: 35min   Evaluating/treating patient, reviewing data/labs/imaging, discussing case with multidisciplinary team, discussing plan/goals of care with patient/family. Non-inclusive of procedure time.

## 2019-03-11 NOTE — BRIEF OPERATIVE NOTE - PROCEDURE
<<-----Click on this checkbox to enter Procedure EGD (esophagogastroduodenoscopy)  03/11/2019    Active  LATESHA  Laparoscopic sleeve gastrectomy  03/11/2019    Active  LATESHA

## 2019-03-11 NOTE — CONSULT NOTE ADULT - SUBJECTIVE AND OBJECTIVE BOX
PULMONARY/CRITICAL CARE        Patient is a 43y old  Female who presents with a chief complaint of gastric sleeve  BRIEF HOSPITAL COURSE: ***    Events last 24 hours: ***    PAST MEDICAL & SURGICAL HISTORY:  Abnormal vaginal bleeding: has irregular menses ususally on oral contraceptives to regulate  Morbid obesity with BMI of 60.0-69.9, adult  ALAN on CPAP  GERD (gastroesophageal reflux disease)  Bronchitis: recent bronchitis last week  Type 2 diabetes mellitus  Hypothyroid  Hypertension  S/P dilation and curettage: 2017 x2  History of arthroscopy of right shoulder  H/O oophorectomy: right  Benign teratoma of ovary, right    Allergies    bee stings (Swelling; Rash)  blue berries (Swelling)  penicillins (Swelling)    Intolerances      FAMILY HISTORY:  Family history of breast cancer (Mother)  Family history of bladder cancer (Father)      Review of Systems:  CONSTITUTIONAL: No fever, chills, or fatigue  EYES: No eye pain, visual disturbances, or discharge  ENMT:  No difficulty hearing, tinnitus, vertigo; No sinus or throat pain  NECK: No pain or stiffness  RESPIRATORY: No cough, wheezing, chills or hemoptysis; No shortness of breath  CARDIOVASCULAR: No chest pain, palpitations, dizziness, or leg swelling  GASTROINTESTINAL: mild  abdomina pain. No nausea, vomiting, or hematemesis; No diarrhea or constipation. No melena or hematochezia.  GENITOURINARY: No dysuria, frequency, hematuria, or incontinence  NEUROLOGICAL: No headaches, memory loss, loss of strength, numbness, or tremors  SKIN: No itching, burning, rashes, or lesions   MUSCULOSKELETAL: No joint pain or swelling; No muscle, back, or extremity pain  PSYCHIATRIC: No depression, anxiety, mood swings, or difficulty sleeping      Medications:    metoprolol tartrate Injectable 5 milliGRAM(s) IV Push every 6 hours      acetaminophen  IVPB .. 1000 milliGRAM(s) IV Intermittent every 6 hours  HYDROmorphone  Injectable 0.5 milliGRAM(s) IV Push every 4 hours PRN  ibuprofen IVPB .. 800 milliGRAM(s) IV Intermittent every 6 hours PRN  ondansetron Injectable 4 milliGRAM(s) IV Push every 6 hours      enoxaparin Injectable 40 milliGRAM(s) SubCutaneous every 12 hours    hyoscyamine SL 0.125 milliGRAM(s) SubLingual every 6 hours PRN  pantoprazole  Injectable 40 milliGRAM(s) IV Push daily      dextrose 40% Gel 15 Gram(s) Oral once PRN  dextrose 50% Injectable 12.5 Gram(s) IV Push once  dextrose 50% Injectable 25 Gram(s) IV Push once  dextrose 50% Injectable 25 Gram(s) IV Push once  glucagon  Injectable 1 milliGRAM(s) IntraMuscular once PRN  insulin lispro (HumaLOG) corrective regimen sliding scale   SubCutaneous three times a day before meals    dextrose 5%. 1000 milliLiter(s) IV Continuous <Continuous>  lactated ringers. 1000 milliLiter(s) IV Continuous <Continuous>  lactated ringers. 1000 milliLiter(s) IV Continuous <Continuous>                ICU Vital Signs Last 24 Hrs  T(C): 36.8 (11 Mar 2019 18:45), Max: 36.8 (11 Mar 2019 18:45)  T(F): 98.3 (11 Mar 2019 18:45), Max: 98.3 (11 Mar 2019 18:45)  HR: 96 (11 Mar 2019 18:34) (96 - 111)  BP: 141/83 (11 Mar 2019 18:34) (141/83 - 180/85)  BP(mean): 98 (11 Mar 2019 18:34) (98 - 98)  ABP: --  ABP(mean): --  RR: 20 (11 Mar 2019 18:34) (17 - 73)  SpO2: 93% (11 Mar 2019 18:34) (92% - 98%)    Vital Signs Last 24 Hrs  T(C): 36.8 (11 Mar 2019 18:45), Max: 36.8 (11 Mar 2019 18:45)  T(F): 98.3 (11 Mar 2019 18:45), Max: 98.3 (11 Mar 2019 18:45)  HR: 96 (11 Mar 2019 18:34) (96 - 111)  BP: 141/83 (11 Mar 2019 18:34) (141/83 - 180/85)  BP(mean): 98 (11 Mar 2019 18:34) (98 - 98)  RR: 20 (11 Mar 2019 18:34) (17 - 73)  SpO2: 93% (11 Mar 2019 18:34) (92% - 98%)        I&O's Detail    11 Mar 2019 07:01  -  11 Mar 2019 19:15  --------------------------------------------------------  IN:    IV PiggyBack: 350 mL    lactated ringers.: 625 mL    lactated ringers.: 1800 mL    lactated ringers.: 150 mL  Total IN: 2925 mL    OUT:  Total OUT: 0 mL    Total NET: 2925 mL            LABS:                CAPILLARY BLOOD GLUCOSE      POCT Blood Glucose.: 211 mg/dL (11 Mar 2019 18:30)        CULTURES:      Physical Examination:    General: No acute distress.  obese female    HEENT: Pupils equal, reactive to light.  Symmetric.    PULM: Clear to auscultation bilaterally, no significant sputum production    CVS: Regular rate and rhythm, no murmurs, rubs, or gallops    ABD: Soft, nondistended,mildly tender, decreased bowel sounds, no masses    EXT: No edema, nontender    SKIN: Warm and well perfused, no rashes noted.    NEURO: Alert, oriented, interactive, nonfocal    RADIOLOGY: ***    CRITICAL CARE TIME SPENT: ***

## 2019-03-11 NOTE — ANESTHESIA FOLLOW-UP NOTE - NSEVALATIONFT_GEN_ALL_CORE
Central line placement under aseptic technique with sono guidance.Gown, gloves, mask, head covered. RIJ placed. Xray done in preop area.

## 2019-03-12 ENCOUNTER — TRANSCRIPTION ENCOUNTER (OUTPATIENT)
Age: 44
End: 2019-03-12

## 2019-03-12 VITALS — TEMPERATURE: 98 F

## 2019-03-12 LAB
ANION GAP SERPL CALC-SCNC: 9 MMOL/L — SIGNIFICANT CHANGE UP (ref 5–17)
BUN SERPL-MCNC: 9 MG/DL — SIGNIFICANT CHANGE UP (ref 7–23)
CALCIUM SERPL-MCNC: 8.6 MG/DL — SIGNIFICANT CHANGE UP (ref 8.4–10.5)
CHLORIDE SERPL-SCNC: 100 MMOL/L — SIGNIFICANT CHANGE UP (ref 96–108)
CO2 SERPL-SCNC: 27 MMOL/L — SIGNIFICANT CHANGE UP (ref 22–31)
CREAT SERPL-MCNC: 0.67 MG/DL — SIGNIFICANT CHANGE UP (ref 0.5–1.3)
GLUCOSE BLDC GLUCOMTR-MCNC: 129 MG/DL — HIGH (ref 70–99)
GLUCOSE BLDC GLUCOMTR-MCNC: 133 MG/DL — HIGH (ref 70–99)
GLUCOSE BLDC GLUCOMTR-MCNC: 148 MG/DL — HIGH (ref 70–99)
GLUCOSE SERPL-MCNC: 129 MG/DL — HIGH (ref 70–99)
HCT VFR BLD CALC: 37.1 % — SIGNIFICANT CHANGE UP (ref 34.5–45)
HGB BLD-MCNC: 11.3 G/DL — LOW (ref 11.5–15.5)
MAGNESIUM SERPL-MCNC: 1.5 MG/DL — LOW (ref 1.6–2.6)
MCHC RBC-ENTMCNC: 24.1 PG — LOW (ref 27–34)
MCHC RBC-ENTMCNC: 30.5 GM/DL — LOW (ref 32–36)
MCV RBC AUTO: 79.1 FL — LOW (ref 80–100)
NRBC # BLD: 0 /100 WBCS — SIGNIFICANT CHANGE UP (ref 0–0)
PHOSPHATE SERPL-MCNC: 4.2 MG/DL — SIGNIFICANT CHANGE UP (ref 2.5–4.5)
PLATELET # BLD AUTO: 483 K/UL — HIGH (ref 150–400)
POTASSIUM SERPL-MCNC: 4 MMOL/L — SIGNIFICANT CHANGE UP (ref 3.5–5.3)
POTASSIUM SERPL-SCNC: 4 MMOL/L — SIGNIFICANT CHANGE UP (ref 3.5–5.3)
RBC # BLD: 4.69 M/UL — SIGNIFICANT CHANGE UP (ref 3.8–5.2)
RBC # FLD: 18.2 % — HIGH (ref 10.3–14.5)
SODIUM SERPL-SCNC: 136 MMOL/L — SIGNIFICANT CHANGE UP (ref 135–145)
WBC # BLD: 12.74 K/UL — HIGH (ref 3.8–10.5)
WBC # FLD AUTO: 12.74 K/UL — HIGH (ref 3.8–10.5)

## 2019-03-12 PROCEDURE — 94660 CPAP INITIATION&MGMT: CPT

## 2019-03-12 PROCEDURE — 80048 BASIC METABOLIC PNL TOTAL CA: CPT

## 2019-03-12 PROCEDURE — 88305 TISSUE EXAM BY PATHOLOGIST: CPT

## 2019-03-12 PROCEDURE — 86900 BLOOD TYPING SEROLOGIC ABO: CPT

## 2019-03-12 PROCEDURE — 85027 COMPLETE CBC AUTOMATED: CPT

## 2019-03-12 PROCEDURE — C1889: CPT

## 2019-03-12 PROCEDURE — 36415 COLL VENOUS BLD VENIPUNCTURE: CPT

## 2019-03-12 PROCEDURE — 86901 BLOOD TYPING SEROLOGIC RH(D): CPT

## 2019-03-12 PROCEDURE — 86850 RBC ANTIBODY SCREEN: CPT

## 2019-03-12 PROCEDURE — 71045 X-RAY EXAM CHEST 1 VIEW: CPT

## 2019-03-12 PROCEDURE — 83735 ASSAY OF MAGNESIUM: CPT

## 2019-03-12 PROCEDURE — 82962 GLUCOSE BLOOD TEST: CPT

## 2019-03-12 PROCEDURE — 84100 ASSAY OF PHOSPHORUS: CPT

## 2019-03-12 PROCEDURE — 81025 URINE PREGNANCY TEST: CPT

## 2019-03-12 RX ORDER — OMEPRAZOLE 10 MG/1
1 CAPSULE, DELAYED RELEASE ORAL
Qty: 0 | Refills: 0 | COMMUNITY

## 2019-03-12 RX ORDER — ENOXAPARIN SODIUM 100 MG/ML
40 INJECTION SUBCUTANEOUS
Qty: 0 | Refills: 0 | COMMUNITY
End: 2019-03-27

## 2019-03-12 RX ORDER — VALSARTAN 80 MG/1
1 TABLET ORAL
Qty: 0 | Refills: 0 | COMMUNITY

## 2019-03-12 RX ORDER — MAGNESIUM SULFATE 500 MG/ML
2 VIAL (ML) INJECTION ONCE
Qty: 0 | Refills: 0 | Status: COMPLETED | OUTPATIENT
Start: 2019-03-12 | End: 2019-03-12

## 2019-03-12 RX ORDER — ONDANSETRON 8 MG/1
1 TABLET, FILM COATED ORAL
Qty: 0 | Refills: 0 | COMMUNITY

## 2019-03-12 RX ORDER — EMPAGLIFLOZIN, METFORMIN HYDROCHLORIDE 10; 1000 MG/1; MG/1
0 TABLET, EXTENDED RELEASE ORAL
Qty: 0 | Refills: 0 | COMMUNITY

## 2019-03-12 RX ORDER — SODIUM CHLORIDE 9 MG/ML
1000 INJECTION INTRAMUSCULAR; INTRAVENOUS; SUBCUTANEOUS ONCE
Qty: 0 | Refills: 0 | Status: COMPLETED | OUTPATIENT
Start: 2019-03-12 | End: 2019-03-12

## 2019-03-12 RX ADMIN — Medication 50 GRAM(S): at 08:34

## 2019-03-12 RX ADMIN — ONDANSETRON 4 MILLIGRAM(S): 8 TABLET, FILM COATED ORAL at 05:30

## 2019-03-12 RX ADMIN — Medication 5 MILLIGRAM(S): at 11:45

## 2019-03-12 RX ADMIN — Medication 516 MILLIGRAM(S): at 08:43

## 2019-03-12 RX ADMIN — Medication 400 MILLIGRAM(S): at 16:14

## 2019-03-12 RX ADMIN — Medication 800 MILLIGRAM(S): at 09:00

## 2019-03-12 RX ADMIN — Medication 1000 MILLIGRAM(S): at 07:09

## 2019-03-12 RX ADMIN — Medication 5 MILLIGRAM(S): at 06:17

## 2019-03-12 RX ADMIN — Medication 88 MICROGRAM(S): at 05:30

## 2019-03-12 RX ADMIN — Medication 400 MILLIGRAM(S): at 06:30

## 2019-03-12 RX ADMIN — Medication 1000 MILLIGRAM(S): at 17:09

## 2019-03-12 RX ADMIN — SODIUM CHLORIDE 1000 MILLILITER(S): 9 INJECTION INTRAMUSCULAR; INTRAVENOUS; SUBCUTANEOUS at 07:27

## 2019-03-12 RX ADMIN — ONDANSETRON 4 MILLIGRAM(S): 8 TABLET, FILM COATED ORAL at 11:40

## 2019-03-12 RX ADMIN — Medication 400 MILLIGRAM(S): at 01:06

## 2019-03-12 RX ADMIN — ENOXAPARIN SODIUM 40 MILLIGRAM(S): 100 INJECTION SUBCUTANEOUS at 11:05

## 2019-03-12 RX ADMIN — SODIUM CHLORIDE 150 MILLILITER(S): 9 INJECTION, SOLUTION INTRAVENOUS at 05:29

## 2019-03-12 RX ADMIN — PANTOPRAZOLE SODIUM 40 MILLIGRAM(S): 20 TABLET, DELAYED RELEASE ORAL at 11:40

## 2019-03-12 RX ADMIN — Medication 1000 MILLIGRAM(S): at 01:46

## 2019-03-12 NOTE — PROGRESS NOTE ADULT - ASSESSMENT
Pt stable postop gastric sleeve.   Hx severe ALAN on cpap.  Doing well.  Advised see me in office after dc

## 2019-03-12 NOTE — DISCHARGE NOTE PROVIDER - NSDCCPCAREPLAN_GEN_ALL_CORE_FT
PRINCIPAL DISCHARGE DIAGNOSIS  Problem: Morbid obesity  Assessment and Plan of Treatment: Instructed to ambulate and use incentive spirometry frequently. Ice packs to abdominal wall and shoulders as needed for discomfort. Cont bariatric diet and follow nutritional guidelines as instructed. Pain meds as needed. Pt instructed  to follow up with Dr. Aldridge in 1 week.      SECONDARY DISCHARGE DIAGNOSES  Problem: S/P laparoscopic sleeve gastrectomy  Assessment and Plan of Treatment: Instructed to ambulate and use incentive spirometry frequently. Ice packs to abdominal wall and shoulders as needed for discomfort. Cont bariatric diet and follow nutritional guidelines as instructed. Pain meds as needed. Pt instructed  to follow up with Dr. Aldridge in 1 week.

## 2019-03-12 NOTE — PROGRESS NOTE ADULT - SUBJECTIVE AND OBJECTIVE BOX
no significant events overnight reported   pt in icu- apnea related   post op day 1 lap sleeve gastrectomy  VSS    abd soft, obese, mild tender           cv s1s2           chest air entry  labs reviewed    a/p Post op day 1 Lap sleeve gastrectomy   intraop EGD w/o bleeding and lumen was patent   no extravasation on water testing.   cont w/ acid suppression and anti-emetic   inc spirometer, ambulate   bariatric clears today, advance per surgery

## 2019-03-12 NOTE — PROGRESS NOTE ADULT - ASSESSMENT
43 year old Female POD #1 s/p Laparoscopic sleeve gastrectomy with intraoperative EGD is doing well and hemodynamically stable. 43 year old Female with morbid obesity now POD #1 s/p Laparoscopic sleeve gastrectomy with intraoperative EGD is doing well and hemodynamically stable.       Plan:  Cont GI / DVT prophylaxis.   Cont  OOB / ambulation on floor / incentive spirometry.  Cont bariatric clear diet as tolerated .  Dietician consult.   Discussed and reviewed home meds  and pain medication with patient . Discussed medication that requires crushing.  Plan to begin protein shakes at home.  Possibly discharged later today or tomorrow. 43 year old Female with morbid obesity now POD #1 s/p Laparoscopic sleeve gastrectomy with intraoperative EGD is doing well and hemodynamically stable.       Plan:  Supplement Mg  Cont GI / DVT prophylaxis.   Cont  OOB / ambulation on floor / incentive spirometry.  Cont bariatric clear diet as tolerated .  Dietician consult.   Discussed and reviewed home meds  and pain medication with patient . Discussed medication that requires crushing.  Plan to begin protein shakes at home.  Possibly discharged later today or tomorrow.

## 2019-03-12 NOTE — DISCHARGE NOTE PROVIDER - CARE PROVIDER_API CALL
Akilah Aldridge (MD)  Surgery  [address] [place] [address]  Phone: [phone]  Fax: [phone]  Follow Up Time: 1 week

## 2019-03-12 NOTE — DISCHARGE NOTE NURSING/CASE MANAGEMENT/SOCIAL WORK - NSDCDPATPORTLINK_GEN_ALL_CORE
You can access the [facility] Patient Portal, offered by [facility], by registering with the following website: [email or web address]

## 2019-03-12 NOTE — DISCHARGE NOTE PROVIDER - REASON FOR ADMISSION
45 yo F with PMHx of morbid obesity admitted to [facility] for scheduled laparoscopic sleeve gastrectomy.

## 2019-03-12 NOTE — PROGRESS NOTE ADULT - SUBJECTIVE AND OBJECTIVE BOX
PULMONARY/CRITICAL CARE      INTERVAL HPI/OVERNIGHT EVENTS:    43y FemaleHPI:  Doing well, no sob, less abd pain. Alert, nad      PAST MEDICAL & SURGICAL HISTORY:  Abnormal vaginal bleeding: has irregular menses ususally on oral contraceptives to regulate  Morbid obesity with BMI of 60.0-69.9, adult  ALAN on CPAP  GERD (gastroesophageal reflux disease)  Bronchitis: recent bronchitis last week  Type 2 diabetes mellitus  Hypothyroid  Hypertension  S/P dilation and curettage: 2017 x2  History of arthroscopy of right shoulder  H/O oophorectomy: right  Benign teratoma of ovary, right        ICU Vital Signs Last 24 Hrs  T(C): 36.8 (12 Mar 2019 04:00), Max: 36.9 (12 Mar 2019 00:07)  T(F): 98.2 (12 Mar 2019 04:00), Max: 98.4 (12 Mar 2019 00:07)  HR: 79 (12 Mar 2019 06:00) (78 - 111)  BP: 147/88 (12 Mar 2019 06:00) (123/75 - 180/85)  BP(mean): 101 (12 Mar 2019 06:00) (89 - 101)  ABP: --  ABP(mean): --  RR: 16 (12 Mar 2019 06:00) (14 - 73)  SpO2: 97% (12 Mar 2019 06:00) (92% - 99%)    Qtts:     I&O's Summary    11 Mar 2019 07:01  -  12 Mar 2019 06:41  --------------------------------------------------------  IN: 4525 mL / OUT: 0 mL / NET: 4525 mL            REVIEW OF SYSTEMS:    CONSTITUTIONAL: No fever, weight loss, or fatigue  EYES: No eye pain, visual disturbances, or discharge  ENMT:  No difficulty hearing, tinnitus, vertigo; No sinus or throat pain  NECK: No pain or stiffness  BREASTS: No pain, masses, or nipple discharge  RESPIRATORY: No cough, wheezing, chills or hemoptysis; No shortness of breath  CARDIOVASCULAR: No chest pain, palpitations, dizziness, or leg swelling  GASTROINTESTINAL: mild  abdominal pain. No nausea, vomiting, or hematemesis; No diarrhea or constipation. No melena or hematochezia.  GENITOURINARY: No dysuria, frequency, hematuria, or incontinence  NEUROLOGICAL: No headaches, memory loss, loss of strength, numbness, or tremors  SKIN: No itching, burning, rashes, or lesions   LYMPH NODES: No enlarged glands  ENDOCRINE: No heat or cold intolerance; No hair loss  MUSCULOSKELETAL: No joint pain or swelling; No muscle, back, or extremity pain, no calf tenderness  PSYCHIATRIC: No depression, anxiety, mood swings, or difficulty sleeping  HEME/LYMPH: No easy bruising, or bleeding gums  ALLERGY AND IMMUNOLOGIC: No hives or eczema      PHYSICAL EXAM:    GENERAL: NAD, well-groomed, well-developed, NAD  HEAD:  Atraumatic, Normocephalic  EYES: EOMI, PERRLA, conjunctiva and sclera clear  ENMT: No tonsillar erythema, exudates, or enlargement; Moist mucous membranes, Good dentition, No lesions  NECK: Supple, No JVD, Normal thyroid  NERVOUS SYSTEM:  Alert & Oriented X3, Good concentration; Motor Strength 5/5 B/L upper and lower extremities  CHEST/LUNG: Clear to percussion bilaterally; No rales, rhonchi, wheezing, or rubs  HEART: Regular rate and rhythm; No murmurs, rubs, or gallops  ABDOMEN: Soft, mildly tender, Nondistended; Bowel sounds decreased  EXTREMITIES:  2+ Peripheral Pulses, No clubbing, cyanosis, or edema  LYMPH: No lymphadenopathy noted  SKIN: No rashes or lesions        LABS:                        11.3   12.74 )-----------( 483      ( 12 Mar 2019 06:36 )             37.1                 vanco through     RADIOLOGY & ADDITIONAL STUDIES:      CRITICAL CARE TIME SPENT:

## 2019-03-12 NOTE — PROGRESS NOTE ADULT - SUBJECTIVE AND OBJECTIVE BOX
Pre-Op Dx: Morbid obesity  Procedure: Laparoscopic sleeve gastrectomy with EGD (esophagogastroduodenoscopy)  Surgeon:     HPI:   43 year old Female s/p Laparoscopic sleeve gastrectomy with intraoperative EGD POD # 1. Patient is ambulating on the floor and utilizing incentive spirometry. She is tolerating clear liquid diet and urinating well. Minimal incisional discomfort. Denies any nausea or vomiting. Pt seen and examined at the bedside.     VITALS:  Vital Signs Last 24 Hrs  T(C): 36.5 (12 Mar 2019 08:22), Max: 36.9 (12 Mar 2019 00:07)  T(F): 97.7 (12 Mar 2019 08:22), Max: 98.4 (12 Mar 2019 00:07)  HR: 79 (12 Mar 2019 06:00) (78 - 111)  BP: 147/88 (12 Mar 2019 06:00) (123/75 - 180/85)  BP(mean): 101 (12 Mar 2019 06:00) (89 - 101)  RR: 16 (12 Mar 2019 06:00) (14 - 73)  SpO2: 97% (12 Mar 2019 06:00) (92% - 99%)    03-11 @ 07:01  -  03-12 @ 07:00  --------------------------------------------------------  IN: 4925 mL / OUT: 500 mL / NET: 4425 mL    03-12 @ 07:01  -  03-12 @ 08:52  --------------------------------------------------------  IN: 1000 mL / OUT: 0 mL / NET: 1000 mL    LABS:                        11.3   12.74 )-----------( 483      ( 12 Mar 2019 06:36 )             37.1     03-12    136  |  100  |  9   ----------------------------<  129<H>  4.0   |  27  |  0.67    Ca    8.6      12 Mar 2019 06:36  Phos  4.2     03-12  Mg     1.5     03-12        CAPILLARY BLOOD GLUCOSE      POCT Blood Glucose.: 148 mg/dL (12 Mar 2019 07:52)  POCT Blood Glucose.: 171 mg/dL (11 Mar 2019 23:08)  POCT Blood Glucose.: 211 mg/dL (11 Mar 2019 18:30)  POCT Blood Glucose.: 155 mg/dL (11 Mar 2019 09:10)        Physical Exam:  General: A/O x 3, NAD, sitting comfortably in chair  Abdominal: soft, ND, nontender to palpation, incisions C/D/I  Extremities: no edema, no calf tenderness B/L

## 2019-03-12 NOTE — PHARMACOTHERAPY INTERVENTION NOTE - COMMENTS
Patient is post Bariatric surgery.  Patient was Education on home medication administration issues  and meds sent from [facility] Meds to Bed Program. .  Patient and pharmacist reviewed use and side effects . We  also reviewed  issues of Constipation  and the  treatment. We reviewed the  proper administration and issues of home medication administration post surgery.  We reviewed what medication to avoid after bariatric surgery  and Why ( example avoid NSAID). Stressed MAX dose of APAP and narcotic use -& the proper pain treatment to avoid problems and side effects  when taken for post op  pain. spoke about lovenox also

## 2019-03-12 NOTE — DISCHARGE NOTE PROVIDER - NSDCACTIVITY_GEN_ALL_CORE
Do not drive or operate machinery/Showering allowed/Stairs allowed/Do not make important decisions/Walking - Indoors allowed/No heavy lifting/straining/Walking - Outdoors allowed

## 2019-03-12 NOTE — DISCHARGE NOTE PROVIDER - NSDCCPTREATMENT_GEN_ALL_CORE_FT
PRINCIPAL PROCEDURE  Procedure: Gastrectomy, sleeve, laparoscopic, with EDG  Findings and Treatment: Tolerated procedure well.

## 2019-03-12 NOTE — DISCHARGE NOTE PROVIDER - NSDCFUADDINST_GEN_ALL_CORE_FT
Leave dressing at place of previous central catheter on for 24 hours. Follow up with endocrinologist about when to restart diabetes medications.

## 2019-03-12 NOTE — DISCHARGE NOTE PROVIDER - HOSPITAL COURSE
43 year old female with history of morbid obesity s/p laparoscopic sleeve gastrectomy with intraoperative EGD. Post operatively patient did well, good urine output  and ambulating well on floor. Patient tolerated advancement of diet to bariatric clears.  Nutritional guidelines were reviewed with the nutritionist. Patient felt ready for discharge to home. Pt instructed to drink small frequent amounts, start protein drinks and follow dietary guidelines. 43 year old female with history of morbid obesity s/p laparoscopic sleeve gastrectomy with intraoperative EGD. Post operatively patient did well, good urine output  and ambulating well on floor. Patient tolerated advancement of diet to bariatric clears. Due to limited peripheral IV access, patient had central line placed prior to surgery. Patient tolerated removal of central line under sterile technique prior to discharge. Nutritional guidelines were reviewed with the nutritionist. Patient felt ready for discharge to home. Pt instructed to drink small frequent amounts, start protein drinks and follow dietary guidelines.

## 2019-03-12 NOTE — DISCHARGE NOTE PROVIDER - INSTRUCTIONS
Instructed to ambulate and use incentive spirometry frequently. Ice packs to abdominal wall and shoulders as needed for discomfort. Cont bariatric diet and follow nutritional guidelines as instructed. Pain meds as needed. Pt instructed  to follow up with Dr. Aldridge in 1 week.

## 2019-03-13 ENCOUNTER — MESSAGE (OUTPATIENT)
Age: 44
End: 2019-03-13

## 2019-03-13 LAB — SURGICAL PATHOLOGY STUDY: SIGNIFICANT CHANGE UP

## 2019-03-20 ENCOUNTER — APPOINTMENT (OUTPATIENT)
Dept: BARIATRICS | Facility: CLINIC | Age: 44
End: 2019-03-20
Payer: COMMERCIAL

## 2019-03-20 VITALS
HEART RATE: 85 BPM | HEIGHT: 65 IN | SYSTOLIC BLOOD PRESSURE: 144 MMHG | BODY MASS INDEX: 48.82 KG/M2 | OXYGEN SATURATION: 97 % | DIASTOLIC BLOOD PRESSURE: 98 MMHG | WEIGHT: 293 LBS

## 2019-03-20 PROCEDURE — 99024 POSTOP FOLLOW-UP VISIT: CPT

## 2019-03-20 NOTE — REASON FOR VISIT
[Post Operative Visit] : a post operative visit for [S/P Bariatric Surgery] : s/p bariatric surgery [Morbid Obesity (BMI>40)] : morbid obesity (bmi>40)

## 2019-03-26 NOTE — PHYSICAL EXAM
[Obese, well nourished, in no acute distress] : obese, well nourished, in no acute distress [Normal] : affect appropriate [de-identified] : MINIMAL INCISIONAL DISCOMFORT UPON PALPATION. NO ERYTHEMA NO SWELLING NOTED. INCISION SITES INTACT AND HEALING WELL.

## 2019-03-26 NOTE — REVIEW OF SYSTEMS
[Recent Change In Weight] : ~T recent weight change [Abdominal Pain] : abdominal pain [Reflux/Heartburn] : reflux/heartburn [Negative] : Psychiatric [Fever] : no fever [Chills] : no chills [Dysphagia] : no dysphagia [Chest Pain] : no chest pain [Vomiting] : no vomiting [Constipation] : no constipation [Diarrhea] : no diarrhea [Hernia] : no hernia [FreeTextEntry2] : weight loss  [FreeTextEntry7] : minimal incisional discomfort   / mild reflux symptoms / loose BM yesterday.

## 2019-03-26 NOTE — HISTORY OF PRESENT ILLNESS
[Procedure: ___] : Procedure performed: [unfilled]  [Date of Surgery: ___] : Date of Surgery:   [unfilled] [Surgeon Name:   ___] : Surgeon Name: Dr. HOLLAND [Pre-Op Weight ___] : Pre-op weight was [unfilled] lbs [de-identified] : 43 year old F 1 WEEK s/p lap sleeve gastrectomy and intra- op EGD. Weight loss since last visit.Reports minimal incisional discomfort.Denies any nausea.Patient is consuming ~ 15 oz of protein shake per day ( 45 g of protein)/ 40 - 45 oz of liquid per day. Occasional discomfort/ mild reflux when consuming protein shake which resolves soon after. Pt states she is drinking slowly. Pt currently taking omeprazole 20 mg po qd . Pt states prior to surgery she was taking omeprazole 40 mg po qd.   Pt continues to work on consuming a sufficient amount of zero calorie liquid per day. Plan to start taking MVI daily. Pt states having a loose BM yesterday. Denies any fever or chills.  Denies any nausea or vomiting.Exercising regularly at home. Plan to start strength training at 6 weeks post op.

## 2019-03-26 NOTE — ASSESSMENT
[FreeTextEntry1] : 43 year old F 1 WEEK s/p lap sleeve gastrectomy and intra- op EGD. Weight loss since last visit.1 episode of loose BM yesterday/ mild GERD\par \par Plan to start taking daily multivitamins and continue protein and liquid intake as instructed.\par Call for any questions or concerns.\par \par Will increase omeprazole 20 mg to 40 mg . Pt instructed to call office at end of week if symptoms persist or worsen. \par \par Nutritional counseling has been provided. The patient is encouraged to remain calorie conscious and continue a low fat, low carbohydrate, protein focus diet. Pt encouraged to participate in a daily exercise regimen incorporating cardio and  strength training. Encouraged patient to be more cognizant of how fast she is drinking.\par \par Plan to follow up with PCP end of week to assess BP/ discuss restarting HCTZ. \par \par Plan to schedule a one on one visit with nutritionist prior to next visit. \par \par Return to office in 3 weeks or earlier if any concerns.\par

## 2019-04-11 ENCOUNTER — APPOINTMENT (OUTPATIENT)
Dept: BARIATRICS | Facility: CLINIC | Age: 44
End: 2019-04-11
Payer: COMMERCIAL

## 2019-04-11 VITALS
OXYGEN SATURATION: 97 % | HEART RATE: 85 BPM | WEIGHT: 293 LBS | DIASTOLIC BLOOD PRESSURE: 98 MMHG | HEIGHT: 65 IN | SYSTOLIC BLOOD PRESSURE: 140 MMHG | BODY MASS INDEX: 48.82 KG/M2

## 2019-04-11 PROCEDURE — 99024 POSTOP FOLLOW-UP VISIT: CPT

## 2019-04-11 RX ORDER — HYDROCHLOROTHIAZIDE 25 MG/1
25 TABLET ORAL
Refills: 0 | Status: COMPLETED | COMMUNITY
End: 2019-04-11

## 2019-04-11 RX ORDER — OXYCODONE AND ACETAMINOPHEN 5; 325 MG/1; MG/1
5-325 TABLET ORAL EVERY 6 HOURS
Qty: 12 | Refills: 0 | Status: COMPLETED | COMMUNITY
Start: 2019-03-07 | End: 2019-04-11

## 2019-04-11 RX ORDER — ONDANSETRON 4 MG/1
4 TABLET, ORALLY DISINTEGRATING ORAL 4 TIMES DAILY
Qty: 15 | Refills: 0 | Status: COMPLETED | COMMUNITY
Start: 2019-01-23 | End: 2019-04-11

## 2019-04-11 RX ORDER — ENOXAPARIN SODIUM 100 MG/ML
40 INJECTION SUBCUTANEOUS
Qty: 28 | Refills: 0 | Status: COMPLETED | COMMUNITY
Start: 2019-03-08 | End: 2019-04-11

## 2019-04-11 RX ORDER — MULTIVIT-MIN/FOLIC/VIT K/LYCOP 400-300MCG
250 TABLET ORAL DAILY
Refills: 0 | Status: COMPLETED | COMMUNITY
End: 2019-04-11

## 2019-04-11 RX ORDER — VALSARTAN 320 MG/1
320 TABLET, COATED ORAL
Refills: 0 | Status: COMPLETED | COMMUNITY
End: 2019-04-11

## 2019-04-11 RX ORDER — OMEPRAZOLE 40 MG/1
40 CAPSULE, DELAYED RELEASE ORAL
Refills: 0 | Status: COMPLETED | COMMUNITY
End: 2019-04-11

## 2019-04-11 RX ORDER — OXYCODONE AND ACETAMINOPHEN 5; 325 MG/1; MG/1
5-325 TABLET ORAL EVERY 6 HOURS
Qty: 12 | Refills: 0 | Status: COMPLETED | COMMUNITY
Start: 2019-01-23 | End: 2019-04-11

## 2019-04-11 RX ORDER — ONDANSETRON 4 MG/1
4 TABLET, ORALLY DISINTEGRATING ORAL 4 TIMES DAILY
Qty: 15 | Refills: 0 | Status: COMPLETED | COMMUNITY
Start: 2019-03-07 | End: 2019-04-11

## 2019-04-12 NOTE — REVIEW OF SYSTEMS
[Recent Change In Weight] : ~T recent weight change [Abdominal Pain] : abdominal pain [Negative] : Psychiatric [Fever] : no fever [Chills] : no chills [Dysphagia] : no dysphagia [Chest Pain] : no chest pain [Vomiting] : no vomiting [Constipation] : no constipation [Diarrhea] : no diarrhea [Reflux/Heartburn] : no reflux/ heartburn [Hernia] : no hernia [FreeTextEntry2] : weight loss  [FreeTextEntry7] : minimal incisional discomfort

## 2019-04-12 NOTE — ASSESSMENT
[FreeTextEntry1] : 43 year old female one month s/p laparoscopic sleeve gastrectomy presents for post operative visit. She is doing well and continues to lose weight. Incisions are healing appropriately and spitting suture removed without any complications. The patient was encouraged to continue a pureed/soft low fat, low carbohydrate and high protein diet for another week and a half and then progress to regular foods as tolerated.  Patient was advised to increase ambulation and not to do any heavy lifting until 6 weeks post op.\par \par Nutrition and exercise counseling provided.\par Post op nutrition class\par Switch vitamins to capsules (will assess after labs if multivitamin with iron is necessary)\par Trial stopping omeprazole\par Follow up with PCP about elevated BP\par Follow up in 2 months with labs prior to visit\par Call with any questions or concerns\par

## 2019-04-12 NOTE — HISTORY OF PRESENT ILLNESS
[Date of Surgery: ___] : Date of Surgery:   [unfilled] [Procedure: ___] : Procedure performed: [unfilled]  [Surgeon Name:   ___] : Surgeon Name: Dr. HOLLAND [Pre-Op Weight ___] : Pre-op weight was [unfilled] lbs [de-identified] : 43 year old female one month s/p laparoscopic sleeve gastrectomy presents for post operative visit. She lost 20 lbs since last visit. Patient is consuming the appropriate amount of protein and water daily. She is tolerating advancement of diet to pureed/soft foods. Patient is taking vitamins and omeprazole daily.  She states that the vitamins make her nauseous. She  denies acid reflux symptoms, nausea, vomiting, diarrhea and constipation. She is ambulating frequently for exercise.

## 2019-04-12 NOTE — PHYSICAL EXAM
[Normal] : normoactive bowel sounds, soft and nontender, no hepatosplenomegaly or masses appreciated. Incisions healing appropriately without erythema or drainage [Obese, well nourished, in no acute distress] : obese, well nourished, in no acute distress [de-identified] : spitting suture at right sided incision site

## 2019-06-11 ENCOUNTER — APPOINTMENT (OUTPATIENT)
Dept: BARIATRICS | Facility: CLINIC | Age: 44
End: 2019-06-11
Payer: COMMERCIAL

## 2019-06-11 VITALS
SYSTOLIC BLOOD PRESSURE: 128 MMHG | HEART RATE: 101 BPM | WEIGHT: 293 LBS | HEIGHT: 65 IN | BODY MASS INDEX: 48.82 KG/M2 | DIASTOLIC BLOOD PRESSURE: 69 MMHG | OXYGEN SATURATION: 100 %

## 2019-06-11 DIAGNOSIS — E55.9 VITAMIN D DEFICIENCY, UNSPECIFIED: ICD-10-CM

## 2019-06-11 PROCEDURE — 99214 OFFICE O/P EST MOD 30 MIN: CPT

## 2019-06-11 RX ORDER — DIAPER,BRIEF,INFANT-TODD,DISP
EACH MISCELLANEOUS
Refills: 0 | Status: ACTIVE | COMMUNITY

## 2019-06-11 RX ORDER — MULTIVITAMIN
TABLET ORAL DAILY
Refills: 0 | Status: DISCONTINUED | COMMUNITY
End: 2019-06-11

## 2019-06-11 NOTE — HISTORY OF PRESENT ILLNESS
[Date of Surgery: ___] : Date of Surgery:   [unfilled] [Procedure: ___] : Procedure performed: [unfilled]  [Surgeon Name:   ___] : Surgeon Name: Dr. HOLLAND [Pre-Op Weight ___] : Pre-op weight was [unfilled] lbs [de-identified] : 43 year old female three months s/p laparoscopic sleeve gastrectomy presents for follow up visit. She lost 28 lbs since last visit. She is consuming the appropriate amount of protein and water daily. Patient is tolerating soft foods but has difficulty tolerating white meat chicken and turkey slices. She has same intolerance with cucumber, peppers, celery. She reports she that it feels like it is stuck despite how well she chews the food. However, she can tolerate carrots.  Patient also states that she continues to have acid reflux symptoms if she doesn’t take omeprazole. She gets nausea with some smells and randomly. The nausea resolves spontaneously within 10 min. She is taking vitamins with iron and omeprazole daily. She denies vomiting, diarrhea and constipation. For exercise, she is walking 8-10K steps daily and yoga four times a week.  She remains compliant with CPAP. Patient recently went for labs

## 2019-06-11 NOTE — REVIEW OF SYSTEMS
[Recent Change In Weight] : ~T recent weight change [Dysphagia] : dysphagia [Reflux/Heartburn] : reflux/heartburn [Negative] : Heme/Lymph [Eye Pain] : no eye pain [Red Eyes] : eyes not red [Vision Problems] : no vision problems [Hoarseness] : no hoarseness [Chest Pain] : no chest pain [Odynophagia] : no odynophagia [Palpitations] : no palpitations [Leg Claudication] : no intermittent leg claudication [Abdominal Pain] : no abdominal pain [Vomiting] : no vomiting [Lower Ext Edema] : no lower extremity edema [FreeTextEntry2] : weight loss [Diarrhea] : no diarrhea [Constipation] : no constipation

## 2019-06-11 NOTE — PHYSICAL EXAM
[Obese, well nourished, in no acute distress] : obese, well nourished, in no acute distress [Normal] : affect appropriate [de-identified] : EOMI, anicteric  [de-identified] : soft, nontender, small nontender supraumbilical hernia. incisions well healed.

## 2019-07-18 DIAGNOSIS — D72.829 ELEVATED WHITE BLOOD CELL COUNT, UNSPECIFIED: ICD-10-CM

## 2019-07-18 DIAGNOSIS — E61.1 IRON DEFICIENCY: ICD-10-CM

## 2019-08-23 ENCOUNTER — APPOINTMENT (OUTPATIENT)
Dept: BARIATRICS | Facility: CLINIC | Age: 44
End: 2019-08-23
Payer: COMMERCIAL

## 2019-08-23 VITALS
OXYGEN SATURATION: 97 % | DIASTOLIC BLOOD PRESSURE: 70 MMHG | WEIGHT: 293 LBS | SYSTOLIC BLOOD PRESSURE: 130 MMHG | BODY MASS INDEX: 48.82 KG/M2 | HEIGHT: 65 IN | HEART RATE: 88 BPM

## 2019-08-23 DIAGNOSIS — E61.1 IRON DEFICIENCY: ICD-10-CM

## 2019-08-23 DIAGNOSIS — R16.0 HEPATOMEGALY, NOT ELSEWHERE CLASSIFIED: ICD-10-CM

## 2019-08-23 DIAGNOSIS — I10 ESSENTIAL (PRIMARY) HYPERTENSION: ICD-10-CM

## 2019-08-23 DIAGNOSIS — D72.829 ELEVATED WHITE BLOOD CELL COUNT, UNSPECIFIED: ICD-10-CM

## 2019-08-23 DIAGNOSIS — K29.70 GASTRITIS, UNSPECIFIED, W/OUT BLEEDING: ICD-10-CM

## 2019-08-23 DIAGNOSIS — R79.89 OTHER SPECIFIED ABNORMAL FINDINGS OF BLOOD CHEMISTRY: ICD-10-CM

## 2019-08-23 DIAGNOSIS — E03.9 HYPOTHYROIDISM, UNSPECIFIED: ICD-10-CM

## 2019-08-23 PROCEDURE — 99214 OFFICE O/P EST MOD 30 MIN: CPT

## 2019-08-27 NOTE — PHYSICAL EXAM
[Obese, well nourished, in no acute distress] : obese, well nourished, in no acute distress [Normal] : affect appropriate [de-identified] : EOMI, anicteric  [de-identified] : soft, minimal tenderness left upper quadrant- no swellng , small nontender supraumbilical hernia. incisions well healed.

## 2019-08-27 NOTE — HISTORY OF PRESENT ILLNESS
[Procedure: ___] : Procedure performed: [unfilled]  [Date of Surgery: ___] : Date of Surgery:   [unfilled] [Surgeon Name:   ___] : Surgeon Name: Dr. HOLLAND [Pre-Op Weight ___] : Pre-op weight was [unfilled] lbs [de-identified] : 43 yo F s/p laparoscopic sleeve gastrectomy presents for follow up visit. She lost 22 lbs since last visit.Pt reports discomfort left side sharp / dull achy at times for the last 5 days. Pt states discomfort exacerbated with coughing/ sneezing. She is consuming the appropriate amount of protein and water daily. Patient is tolerating soft foods but has difficulty tolerating white meat chicken and turkey slices. History of mild constipation - aware of constipation remedies. She is taking vitamins with iron.  She denies vomiting, diarrhea and constipation. For exercise, she is walking 8 -10 K steps daily and yoga four times a week.  She remains compliant with CPAP. Patient is requesting to see nutritionist today. Plan to have routine labs prior to next visit.

## 2019-08-27 NOTE — ASSESSMENT
[FreeTextEntry1] : 45 yo F s/p laparoscopic sleeve gastrectomy presents for follow up visit. She lost 22 lbs since last visit.\par \par Nutrition and exercise counseling provided.\par Ice / Heat modalities / acetaminophen as needed . Will touch base next week. \par Continue vitamins / including vitamin D supplementation.\par Script for fasting blood work to be performed prior to next visit. \par Dr. Aldridge saw patient today.\par Follow up in 6 weeks with nutritionist same day\par Call with any questions or concerns\par

## 2019-08-27 NOTE — REVIEW OF SYSTEMS
[Recent Change In Weight] : ~T recent weight change [Dysphagia] : dysphagia [Constipation] : constipation [Negative] : Heme/Lymph [Eye Pain] : no eye pain [Red Eyes] : eyes not red [Vision Problems] : no vision problems [Hoarseness] : no hoarseness [Odynophagia] : no odynophagia [Chest Pain] : no chest pain [Leg Claudication] : no intermittent leg claudication [Palpitations] : no palpitations [Lower Ext Edema] : no lower extremity edema [Abdominal Pain] : no abdominal pain [Diarrhea] : no diarrhea [Vomiting] : no vomiting [Reflux/Heartburn] : no reflux/ heartburn [FreeTextEntry4] : occasionally with chicken and turkey. [FreeTextEntry2] : weight loss [FreeTextEntry7] : occasional/ mild  constipation.    + left sided discomfort with palpation.

## 2019-09-23 NOTE — ASSESSMENT
EXCUSE FOR SCHOOL      2019        Re: Linsey BULLOCK Colby  [address] [facility] [address]      Please excuse Linsey BULLOCK Colby,  2012 from school on 19 due to illness.      RESTRICTIONS: none    NOTES: please administer Polytrim eye drops to both eyes every 4 hours while awake for the next 5-7 days.            SIGNATURE:___________________________________________,   2019  ESTELA Adams: ESTELA Carney: [facility]   Urgent Care Services  [address] [address] [phone]   [FreeTextEntry1] : 43 year old female 3 months s/p laparoscopic sleeve gastrectomy presents for follow up visit. She is doing well and continues to lose weight. Incisions healed appropriately. The patient was encouraged to continue a low fat, low carbohydrate and high protein diet. It was recommended to food journal and document when have food intolerances.  Patient was advised to continue current exercise routine and add strength exercises 2-3 times a week. Reviewed lab results with the patient. Advised to add Vitamin D3 1000 IU once a day. BUN/Cr elevated and was directed to drink more water and repeat labs in 2 weeks. \par \par Nutrition and exercise counseling provided.\par Continue vitamins and continue omeprazole\par Add Vitamin D3 1000 IU once a week\par Repeat BMP in 2 weeks\par Follow up in 6 weeks with nutritionist same day\par Call with any questions or concerns\par

## 2020-04-29 NOTE — H&P PST ADULT - TEMPERATURE IN CELSIUS (DEGREES C)
-- DO NOT REPLY / DO NOT REPLY ALL --  -- Message is from the [facility]--    COVID-19 Universal Screening: Negative    Patient is requesting a medication refill - medication is on active list, but patient is requesting a change to the medication prescription    Change requested: Patient's dose was incorrectly prescribed.  Correct dose should reflect as 2 tablets in the morning and 2 tablets in the evening.  The patient currently has only half of the amount of tablets for the duration.  She would like a call back today with confirmation the correct dose amount and refill was sent to her pharmacy.  This is her second request. Thank you.     RX Name and Dose:   metFORMIN (GLUCOPHAGE) 500mg tablet     Duration: 90 days    Pharmacy  [facility] #[id number] [address]    Patient confirmed the above pharmacy as correct?  Yes    Caller Information       Type Contact Phone    04/29/2020 09:22 AM Phone (Incoming) Jaky Silveira (Self) [phone] (H)          Alternative phone number: [phone]    Turnaround time given to caller:   \"This message will be sent to [state Provider's name]. The clinical team will fulfill your request as soon as they review your message.\"   39.4

## 2020-05-12 ENCOUNTER — APPOINTMENT (OUTPATIENT)
Dept: BARIATRICS | Facility: CLINIC | Age: 45
End: 2020-05-12
Payer: COMMERCIAL

## 2020-05-12 VITALS — BODY MASS INDEX: 47.09 KG/M2 | HEIGHT: 65 IN | WEIGHT: 282.6 LBS

## 2020-05-12 DIAGNOSIS — Z86.39 PERSONAL HISTORY OF OTHER ENDOCRINE, NUTRITIONAL AND METABOLIC DISEASE: ICD-10-CM

## 2020-05-12 DIAGNOSIS — Z87.898 PERSONAL HISTORY OF OTHER SPECIFIED CONDITIONS: ICD-10-CM

## 2020-05-12 DIAGNOSIS — R63.4 ABNORMAL WEIGHT LOSS: ICD-10-CM

## 2020-05-12 PROCEDURE — 99214 OFFICE O/P EST MOD 30 MIN: CPT | Mod: 95

## 2020-05-12 RX ORDER — AZELASTINE HYDROCHLORIDE 137 UG/1
0.1 SPRAY, METERED NASAL
Refills: 0 | Status: DISCONTINUED | COMMUNITY
End: 2020-05-12

## 2020-05-12 RX ORDER — CETIRIZINE HCL 10 MG
TABLET ORAL
Refills: 0 | Status: DISCONTINUED | COMMUNITY
End: 2020-05-12

## 2020-05-12 RX ORDER — FLUTICASONE PROPIONATE 50 UG/1
50 SPRAY, METERED NASAL
Refills: 0 | Status: DISCONTINUED | COMMUNITY
End: 2020-05-12

## 2020-05-14 NOTE — HISTORY OF PRESENT ILLNESS
[Procedure: ___] : Procedure performed: [unfilled]  [Date of Surgery: ___] : Date of Surgery:   [unfilled] [Surgeon Name:   ___] : Surgeon Name: Dr. HOLLAND [Pre-Op Weight ___] : Pre-op weight was [unfilled] lbs [Home] : at home, [unfilled] , at the time of the visit. [Medical Office: ([facility])___] : at the medical office located in  [Patient] : the patient [Self] : self [de-identified] : 44 year old female over a year s/p laparoscopic sleeve gastrectomy presents for follow up visit. She lost 22 lbs since last visit, but she reports no weight loss in last 6 weeks. She attributes weight loss plateau to being more sedentary. 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 She is consuming the appropriate amount of protein and water daily. Patient is tolerating regular foods but continues to have difficulty tolerating white meat chicken. She has similar intolerance with cucumber and peppers, but she can tolerate raw carrots. She hasn’t had acid reflux symptoms since decreasing frequency of omeprazole to every other day. She denies nocturnal reflux symptoms. She continues to get nauseous with some smells and randomly, but less frequently. The nausea resolves spontaneously within 10 min. She is taking vitamins with iron daily. She denies vomiting, diarrhea and constipation. For exercise, she is walking 3-5K steps daily since COVID 19 pandemic. She doesn’t use CPAP anymore. She reports more prominent umbilical hernia and avoids heavy lifting to prevent discomfort.

## 2020-05-14 NOTE — ASSESSMENT
[FreeTextEntry1] : 44 year old female over a year s/p laparoscopic sleeve gastrectomy presents for follow up visit. She is doing well and patient would like to lose more weight.  The patient was encouraged to continue a low fat, low carbohydrate and high protein diet. It was recommended to food journal and make nutritionist appointment. Patient was advised to increase walking to goal of 10 K steps daily and add strength exercises 2-3 times a week as tolerated. Will send exercise packet. It was also suggested that she see Obesity Medicine specialist. \par \par Nutrition and exercise counseling provided.\par Continue vitamins\par Trial stopping omeprazole, but if acid reflux symptoms return, restart omeprazole daily\par Food journal and nutritionist appointment\par Refer to Obesity Medicine\par LABS\par Follow up in 3 months\par Call with any questions or concerns\par

## 2020-05-14 NOTE — PHYSICAL EXAM
[Obese, well nourished, in no acute distress] : obese, well nourished, in no acute distress [Normal] : affect appropriate [de-identified] : EOMI, anicteric

## 2020-05-14 NOTE — REVIEW OF SYSTEMS
[Recent Change In Weight] : ~T recent weight change [Negative] : Heme/Lymph [Hernia] : hernia [Eye Pain] : no eye pain [Red Eyes] : eyes not red [Vision Problems] : no vision problems [Dysphagia] : no dysphagia [Hoarseness] : no hoarseness [Odynophagia] : no odynophagia [Chest Pain] : no chest pain [Palpitations] : no palpitations [Leg Claudication] : no intermittent leg claudication [Lower Ext Edema] : no lower extremity edema [Vomiting] : no vomiting [Abdominal Pain] : no abdominal pain [Diarrhea] : no diarrhea [Constipation] : no constipation [FreeTextEntry2] : weight loss [Reflux/Heartburn] : no reflux/ heartburn

## 2020-06-09 ENCOUNTER — APPOINTMENT (OUTPATIENT)
Dept: BARIATRICS/WEIGHT MGMT | Facility: CLINIC | Age: 45
End: 2020-06-09
Payer: COMMERCIAL

## 2020-06-09 PROCEDURE — 97803 MED NUTRITION INDIV SUBSEQ: CPT | Mod: 95

## 2020-06-10 VITALS — BODY MASS INDEX: 46.98 KG/M2 | WEIGHT: 282 LBS | HEIGHT: 65 IN

## 2020-06-22 ENCOUNTER — APPOINTMENT (OUTPATIENT)
Dept: BARIATRICS/WEIGHT MGMT | Facility: CLINIC | Age: 45
End: 2020-06-22
Payer: COMMERCIAL

## 2020-06-22 DIAGNOSIS — K76.0 FATTY (CHANGE OF) LIVER, NOT ELSEWHERE CLASSIFIED: ICD-10-CM

## 2020-06-22 DIAGNOSIS — I10 ESSENTIAL (PRIMARY) HYPERTENSION: ICD-10-CM

## 2020-06-22 DIAGNOSIS — G47.33 OBSTRUCTIVE SLEEP APNEA (ADULT) (PEDIATRIC): ICD-10-CM

## 2020-06-22 DIAGNOSIS — E11.9 TYPE 2 DIABETES MELLITUS W/OUT COMPLICATIONS: ICD-10-CM

## 2020-06-22 DIAGNOSIS — E78.5 HYPERLIPIDEMIA, UNSPECIFIED: ICD-10-CM

## 2020-06-22 DIAGNOSIS — J30.2 OTHER SEASONAL ALLERGIC RHINITIS: ICD-10-CM

## 2020-06-22 PROCEDURE — 99204 OFFICE O/P NEW MOD 45 MIN: CPT | Mod: 95

## 2020-06-22 RX ORDER — CIPROFLOXACIN HYDROCHLORIDE 500 MG/1
500 TABLET, FILM COATED ORAL
Qty: 20 | Refills: 0 | Status: DISCONTINUED | COMMUNITY
Start: 2020-02-12

## 2020-06-22 RX ORDER — LEVOTHYROXINE SODIUM 88 UG/1
88 TABLET ORAL DAILY
Refills: 0 | Status: ACTIVE | COMMUNITY

## 2020-06-22 RX ORDER — ALBUTEROL SULFATE 2.5 MG/3ML
(2.5 MG/3ML) SOLUTION RESPIRATORY (INHALATION)
Qty: 75 | Refills: 0 | Status: DISCONTINUED | COMMUNITY
Start: 2020-01-15

## 2020-06-22 RX ORDER — EMPAGLIFLOZIN AND METFORMIN HYDROCHLORIDE 12.5; 1 MG/1; MG/1
12.5-1 TABLET ORAL
Refills: 0 | Status: ACTIVE | COMMUNITY
Start: 2018-12-03

## 2020-06-22 RX ORDER — OLMESARTAN MEDOXOMIL AND HYDROCHLOROTHIAZIDE 40; 25 MG/1; MG/1
40-25 TABLET ORAL DAILY
Refills: 0 | Status: ACTIVE | COMMUNITY
Start: 2020-06-22

## 2020-06-22 RX ORDER — ROSUVASTATIN CALCIUM 10 MG/1
10 TABLET, FILM COATED ORAL
Refills: 0 | Status: ACTIVE | COMMUNITY
Start: 2020-05-13

## 2020-06-22 RX ORDER — LEVOCETIRIZINE DIHYDROCHLORIDE 5 MG/1
5 TABLET, FILM COATED ORAL
Refills: 0 | Status: ACTIVE | COMMUNITY

## 2020-06-22 RX ORDER — LEVOFLOXACIN 500 MG/1
500 TABLET, FILM COATED ORAL
Qty: 7 | Refills: 0 | Status: DISCONTINUED | COMMUNITY
Start: 2020-03-04

## 2020-06-22 RX ORDER — OLMESARTAN MEDOXOMIL 40 MG/1
TABLET, FILM COATED ORAL
Refills: 0 | Status: DISCONTINUED | COMMUNITY
End: 2020-06-22

## 2020-06-22 NOTE — REASON FOR VISIT
[Initial Consultation] : an initial consultation for [Diabetes Mellitus] : diabetes mellitus [Hyperlipidemia] : hyperlipidemia [Hypertension] : hypertension [Metabolic Syndrome] : metabolic syndrome [Obesity] : obesity

## 2020-06-22 NOTE — REVIEW OF SYSTEMS
[Negative] : Psychiatric [MED-ROS-Eyes-FT] : Denies history of glaucoma. [MED-ROS-Cons-FT] : Fatigue [MED-ROS-Allmmun-FT] : seasonal allergies w/rhinorrhea, post-nasal drip, sneezing. [MED-ROS-Genituro-FT] : severe menstrual pain, adenomyosis. Denies history of kidney stones [MED-ROS-Endo-FT] : diabetes, hypothyroidism [MED-ROS-Mario-FT] : joint stiffness

## 2020-06-22 NOTE — HISTORY OF PRESENT ILLNESS
[Home] : at home, [unfilled] , at the time of the visit. [Other Location: e.g. Home (Enter Location, City,State)___] : at [unfilled] [Verbal consent obtained from patient] : the patient, [unfilled] [FreeTextEntry1] : 44F PMH Obesity (s/p laparoscopic sleeve gastrectomy on 3/11/2019 by Dr. Akilah Aldridge), DM2, HTN, HLD, GERD, hypothyroidism who presents to weight management for initial evaluation.\par \par Reason for coming: She is motivated to lose weight but has hit a weight loss plateau over the past 6 weeks (gastric sleeve 3/2019). She was referred by Dr. Aldridge from Bariatric surgery.\par \par Weight/Diet History: She endorsed a weight of ~180 lbs in [facility] and college, and steady increase in her 20's to 230 lbs, 30's to 270 lbs, and 40's to a high of 390. She trialed weight watchers in 2008 for 7 months with 27 lb weight loss, and in 2012 with 13 lbs weight loss. She also trialed Nutrisystem/[employer] in 2011 with 60 lbs weight loss over 1 year in 2011. In 3/2019 she had a laparoscopic sleeve gastrectomy. She has steadily lost weight to 282 lbs on 5/12/20, but remained at 282 lbs on her subsequent visit on 6/10/20, likely in the setting of COVID quarantining.\par \par Recent weight 282 lbs on 6/10/20\par \par Diet: Eats ~3 meals per day and a snack. Focuses on getting a protein, and some fruits and vegetables.\par Snack: Usually a cheese stick and apple.\par Beverages: Occasional crystal lite. Mostly water.\par \par Exercise: Walking on treadmill at home, flat, for 30 minutes in the morning and the evening. Was doing more exercise prior to COVID quarantine.\par \par Sleep: Was diagnosed with ALAN prior to surgery. Feels that it has gotten better, stopped wearing her apnea mask 2 months ago and feels well and refreshed with sleep.\par \par Social: Denies tobacco or drug use. Occasional social alcohol use. 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 She also has recently been diagnosed with adenomyosis, and is scheduled to have a hysterectomy in about 2 weeks.

## 2020-06-22 NOTE — ASSESSMENT
[FreeTextEntry1] : 44F PMH Obesity (s/p laparoscopic sleeve gastrectomy on 3/11/2019 by Dr. Akilah Aldridge), DM2, HTN, HLD, GERD, hypothyroidism who presents to weight management for initial evaluation.\par \par # Obesity s/p lap sleeve gastrectomy 3/2019) c/b DM2, HTN, HLD, GERD, hypothyroidism, ALAN: Patient presents with plateau in post-surgical weight loss, weight ~282 lbs (highest weight 390 lbs). She is following with Mitzy Rhodes in nutrition, and Dr. Akilah Aldridge in Bariatric surgery. \par - Discussed medication options in detail. At this time, since patient is now planning potential open surgical hysterectomy in about 2 weeks, I am advising waiting until patient is post-surgical, out of the hospital, eating and ambulating prior to starting a new medication.\par - Will likely  start Topiramate, pending her physical exam appointment.\par - Possibly Phentermine at some point, although she appears to have a history of sinus tachycardia on multiple doctor office visits and stress test, and is on treatment for hypertension. However, these may improve with weight loss and will be followed at office physical. Will hold off GLP-1 agonist at this time given she is on PPI for GERD and the fact that she is already on SGLT2-inhibitor + metformin. \par - Minimize artificial sweeteners\par - Agree with avoid eating at night, although if unavoidable ('s schedule) may consider time-restricted feeding.\par - C/w daily treadmill and activity. Start tracking steps, consider short intervals on incline w/ caution against over-exertion.\par - Patient stopped wearing mask and denies current symptoms of sleep apnea. Advise to c/w mask and consider re-evaluation.\par - Discussed possible reduction of anti-histamine therapy. Went over proper nasal spray technique with goal to reduce anti-histamine intake (which may increase appetite)\par - RTC for physical exam after surgical recovery (hysterectomy).\par \par \par \par Bariatric Surgery History: Sleeve gastrectomy (3/2019)\par \par Obesity Comorbidities: DM2, HTN, dyslipidemia, GERD, hypothyroidism \par \par Obesity Comorbidity resolution or improvement: \par \par Anti-Obesity Medications: Metformin, empagliflozin\par \par Obesity Medication Side Effects: None noted

## 2020-07-06 ENCOUNTER — APPOINTMENT (OUTPATIENT)
Dept: BARIATRICS/WEIGHT MGMT | Facility: CLINIC | Age: 45
End: 2020-07-06

## 2020-08-10 ENCOUNTER — TRANSCRIPTION ENCOUNTER (OUTPATIENT)
Age: 45
End: 2020-08-10

## 2020-09-15 ENCOUNTER — APPOINTMENT (OUTPATIENT)
Dept: BARIATRICS | Facility: CLINIC | Age: 45
End: 2020-09-15

## 2020-09-15 RX ORDER — ATORVASTATIN CALCIUM 10 MG/1
10 TABLET, FILM COATED ORAL
Refills: 0 | Status: COMPLETED | COMMUNITY
End: 2020-09-15

## 2020-09-15 RX ORDER — NORETHINDRONE ACETATE/ETHINYL ESTRADIOL AND FERROUS FUMARATE 1MG-20(21)
1-20 KIT ORAL
Qty: 28 | Refills: 0 | Status: COMPLETED | COMMUNITY
Start: 2020-05-26 | End: 2020-09-15

## 2020-09-15 RX ORDER — NORGESTIMATE AND ETHINYL ESTRADIOL 7DAYSX3 28
0.18/0.215/0.25 KIT ORAL
Refills: 0 | Status: COMPLETED | COMMUNITY
End: 2020-09-15

## 2020-09-16 ENCOUNTER — APPOINTMENT (OUTPATIENT)
Dept: BARIATRICS | Facility: CLINIC | Age: 45
End: 2020-09-16
Payer: COMMERCIAL

## 2020-09-16 VITALS — HEIGHT: 65 IN | WEIGHT: 280.1 LBS | BODY MASS INDEX: 46.67 KG/M2

## 2020-09-16 DIAGNOSIS — K42.9 UMBILICAL HERNIA W/OUT OBSTRUCTION OR GANGRENE: ICD-10-CM

## 2020-09-16 DIAGNOSIS — Z98.84 BARIATRIC SURGERY STATUS: ICD-10-CM

## 2020-09-16 DIAGNOSIS — E66.01 MORBID (SEVERE) OBESITY DUE TO EXCESS CALORIES: ICD-10-CM

## 2020-09-16 PROCEDURE — 99214 OFFICE O/P EST MOD 30 MIN: CPT | Mod: 95

## 2020-09-16 RX ORDER — ERGOCALCIFEROL (VITAMIN D2) 1250 MCG
50000 CAPSULE ORAL
Refills: 0 | Status: ACTIVE | COMMUNITY

## 2020-09-17 NOTE — HISTORY OF PRESENT ILLNESS
[Procedure: ___] : Procedure performed: [unfilled]  [Surgeon Name:   ___] : Surgeon Name: Dr. HOLLAND [Date of Surgery: ___] : Date of Surgery:   [unfilled] [Pre-Op Weight ___] : Pre-op weight was [unfilled] lbs [Home] : at home, [unfilled] , at the time of the visit. [Medical Office: ([facility])___] : at the medical office located in  [Verbal consent obtained from patient] : the patient, [unfilled] [de-identified] : 45 year old female s/p laparoscopic sleeve gastrectomy presents for follow up visit. She lost 2 lbs since last visit. Patient had appointments with nutritionist and Obesity Medicine. She was supposed to have follow up appointments with both, but was able to do so because she needed to have hysterectomy. Two weeks after last visit, she had menorrhagia and became anemic with symptoms of fatigue. Thus consumed fewer meals. Then she had hysterectomy with right oophorectomy five weeks ago. Since then, the anemia and associated symptoms have improved. She is back to having three meals a day and is consuming the appropriate amount of protein and water daily. Patient is tolerating regular foods but continues to have difficulty tolerating white meat chicken and turkey. However, she is now tolerating cucumber and peppers. She tried stopping omeprazole, but four days after last dose, she developed acid reflux symptoms. Therefore, she is now taking omeprazole twice a week, with no symptoms.  She denies nocturnal reflux symptoms. She continues to get nauseous with some smells. The nausea resolves spontaneously within 10 min if removes herself from smell. If she has prolonged exposure to smell, she will vomit, She is taking vitamins with iron daily. She denies diarrhea and constipation. For exercise, she is walking 7K steps daily and hasn’t resumed strength exercises after hysterectomy. She reports more prominent umbilical hernia and avoids heavy lifting to prevent discomfort. Since hysterectomy, she has not felt any bulging of hernia. She did not get labs drawn as requested at last visit.

## 2020-09-17 NOTE — PHYSICAL EXAM
[Obese, well nourished, in no acute distress] : obese, well nourished, in no acute distress [Normal] : affect appropriate [de-identified] : EOMI, anicteric

## 2020-09-17 NOTE — ASSESSMENT
[FreeTextEntry1] : 45 year old female s/p laparoscopic sleeve gastrectomy presents for follow up visit. She is doing well. The patient was encouraged to continue a low fat, low carbohydrate and high protein diet. It was recommended that she make follow up nutritionist appointment and follow up appointment with Obesity Medicine specialist. Patient was advised to increase walking to goal of 10 K steps daily and add strength exercises 2-3 times a week when permitted. She may need to follow up with GI about acid reflux symptoms - will reevaluate at next visit. \par \par Nutrition and exercise counseling provided.\par Continue vitamins and continue omeprazole\par Follow up nutritionist appointment\par Follow up with Obesity Medicine\par LABS\par Follow up IN OFFICE 2-3 months same day that Dr. Luke in office\par Call with any questions or concerns\par

## 2020-09-17 NOTE — REVIEW OF SYSTEMS
[Recent Change In Weight] : ~T recent weight change [Hernia] : hernia [Negative] : Heme/Lymph [Eye Pain] : no eye pain [Red Eyes] : eyes not red [Vision Problems] : no vision problems [Dysphagia] : no dysphagia [Hoarseness] : no hoarseness [Odynophagia] : no odynophagia [Chest Pain] : no chest pain [Palpitations] : no palpitations [Leg Claudication] : no intermittent leg claudication [Lower Ext Edema] : no lower extremity edema [Abdominal Pain] : no abdominal pain [Vomiting] : no vomiting [Constipation] : no constipation [Diarrhea] : no diarrhea [Reflux/Heartburn] : no reflux/ heartburn [FreeTextEntry2] : weight loss

## 2021-02-17 NOTE — PRE-OP CHECKLIST - SELECT TESTS ORDERED
If you receive a survey via e-mail or mail, please take the time to complete and return as your feedback is very helpful to our practice.           If your neurological testing is not going to be scheduled today our Pre-Service Department will contact you to schedule within one to two days. If you would like to call and schedule when it is convenient for you or if you need to reschedule your appointment please call the Pre-Service Department at [phone].    Your tests will be reviewed by the Benefits Department and if there are any concerns regarding prior authorization or financial obligation they will contact you. We recommend you still contact your insurance company to see if the test, provider, and location of service are covered, and if so, will there be any out of pocket expenses.     If you should have any concerns regarding the financial obligation of the tests please contact our Financial Advocates at [phone] and they will be happy to assist you.                 Thank you for letting us care for you today.             In order to make sure we are meeting our patients' needs, we require a 36 hour notice from you prior to picking up your medications. Attached is a table that will help you determine when your prescriptions will be ready for pick-up.                      If the refill is requested between when the clinic opens and 12 pm on  You can  your prescription at the pharmacy after 6 PM on   Monday Tuesday Tuesday Wednesday Wednesday Thursday Thursday Friday Friday Monday     If the refill is requested between 12 pm and after the clinic closes on You can  your prescription at the pharmacy after 12 PM on   Monday Wednesday Tuesday Thursday Wednesday Friday Thursday Monday Friday Tuesday       
BMP/Type and Cross/CBC/EKG/CXR/UCG

## 2022-12-20 NOTE — ASU DISCHARGE PLAN (ADULT/PEDIATRIC). - DISCHARGE DATE
"----- Message from Sherri Ortegaas sent at 12/20/2022 10:24 AM CST -----  Regarding: Deborah "Send out lab at [facility]"  .Type: Patient Call Back    Who called Deborah "Send out lab at [facility]"    What is the request in detail: [facility] can not process pt's lab work due to being lost in transit and it will be cancelled. Please redraw & reorder lab     Can the clinic reply by [facility]? Call back     Would the patient rather a call back or a response via [facility]?  Call back     Best call back number: [phone]            " 27-Nov-2017 17:05

## 2025-06-17 ENCOUNTER — OFFICE (OUTPATIENT)
Dept: URBAN - METROPOLITAN AREA CLINIC 6 | Facility: CLINIC | Age: 50
Setting detail: OPHTHALMOLOGY
End: 2025-06-17
Payer: COMMERCIAL

## 2025-06-17 ENCOUNTER — RX ONLY (RX ONLY)
Age: 50
End: 2025-06-17

## 2025-06-17 DIAGNOSIS — H15.092: ICD-10-CM

## 2025-06-17 DIAGNOSIS — E11.9: ICD-10-CM

## 2025-06-17 PROCEDURE — 99203 OFFICE O/P NEW LOW 30 MIN: CPT | Performed by: OPHTHALMOLOGY

## 2025-06-17 ASSESSMENT — VISUAL ACUITY
OD_BCVA: 20/20
OS_BCVA: 20/25

## 2025-06-17 ASSESSMENT — LID EXAM ASSESSMENTS: OS_COMMENTS: LID EVERTED NO FB

## 2025-06-17 ASSESSMENT — REFRACTION_AUTOREFRACTION
OD_CYLINDER: -0.75
OD_SPHERE: -0.25
OS_SPHERE: -0.25
OS_AXIS: 101
OS_CYLINDER: -0.25
OD_AXIS: 059

## 2025-06-17 ASSESSMENT — KERATOMETRY
OS_K1POWER_DIOPTERS: 47.50
OD_AXISANGLE_DEGREES: 053
OD_K2POWER_DIOPTERS: 47.50
OS_K2POWER_DIOPTERS: 47.75
OD_K1POWER_DIOPTERS: 47.00
OS_AXISANGLE_DEGREES: 142

## 2025-06-17 ASSESSMENT — TONOMETRY
OS_IOP_MMHG: 17
OD_IOP_MMHG: 17